# Patient Record
Sex: FEMALE | Race: OTHER | HISPANIC OR LATINO | URBAN - METROPOLITAN AREA
[De-identification: names, ages, dates, MRNs, and addresses within clinical notes are randomized per-mention and may not be internally consistent; named-entity substitution may affect disease eponyms.]

---

## 2017-01-16 ENCOUNTER — EMERGENCY (EMERGENCY)
Facility: HOSPITAL | Age: 57
LOS: 1 days | Discharge: HOME | End: 2017-01-16
Attending: EMERGENCY MEDICINE | Admitting: EMERGENCY MEDICINE
Payer: MEDICAID

## 2017-01-16 VITALS
HEART RATE: 82 BPM | DIASTOLIC BLOOD PRESSURE: 75 MMHG | OXYGEN SATURATION: 98 % | WEIGHT: 106.04 LBS | RESPIRATION RATE: 17 BRPM | HEIGHT: 63 IN | SYSTOLIC BLOOD PRESSURE: 124 MMHG | TEMPERATURE: 98 F

## 2017-01-16 DIAGNOSIS — M25.561 PAIN IN RIGHT KNEE: ICD-10-CM

## 2017-01-16 DIAGNOSIS — J45.909 UNSPECIFIED ASTHMA, UNCOMPLICATED: ICD-10-CM

## 2017-01-16 DIAGNOSIS — Z88.5 ALLERGY STATUS TO NARCOTIC AGENT: ICD-10-CM

## 2017-01-16 PROCEDURE — 73562 X-RAY EXAM OF KNEE 3: CPT | Mod: 26,RT

## 2017-01-16 PROCEDURE — 99283 EMERGENCY DEPT VISIT LOW MDM: CPT

## 2017-01-16 RX ORDER — IBUPROFEN 200 MG
600 TABLET ORAL ONCE
Qty: 0 | Refills: 0 | Status: COMPLETED | OUTPATIENT
Start: 2017-01-16 | End: 2017-01-16

## 2017-01-16 RX ADMIN — Medication 600 MILLIGRAM(S): at 23:48

## 2017-01-16 RX ADMIN — Medication 600 MILLIGRAM(S): at 22:40

## 2017-01-16 NOTE — ED ADULT NURSE NOTE - CHIEF COMPLAINT QUOTE
patient brought in by ambulance from [facility] complaining of pain on the L knee. + history of fall several months back and hurt knee. Pt + ETOH smell.

## 2017-01-16 NOTE — ED PROVIDER NOTE - MEDICAL DECISION MAKING DETAILS
R knee pain x months after fall, will check xrays, nsaids, and likely d/c w/ [place] [facility] [facility] f/u

## 2017-01-16 NOTE — ED PROVIDER NOTE - ATTENDING CONTRIBUTION TO CARE
56 yof pw R knee pain x 2 mo.  states sustained after falling on stairs initially.  able to ambulate but needs help with climbing stairs.  No calf pain or tenderness.    agree w/ resident, no obvious joint effusion, able to raise hip and extend knee (w/ pain), no erythema, no deformity.  will xray, possibly soft tissue injury.

## 2017-01-16 NOTE — ED ADULT TRIAGE NOTE - CHIEF COMPLAINT QUOTE
patient brought in by ambulance from [facility] complaining of pain on the L knee. + history of fall several months back and hurt knee. Pt + ETOH smell.

## 2017-01-16 NOTE — ED PROVIDER NOTE - OBJECTIVE STATEMENT
57yo F p/w 2mo of R knee pain s/p fall on stairs.  Was seen at [place] ED w/ neg xrays per pt.  Still w/ pain and needs help ambulating w/ stairs.  Taking tylenol w/o relief.  Denies further trauma.  Hasn't seen an MD since initial ED visit.

## 2017-11-24 ENCOUNTER — EMERGENCY (EMERGENCY)
Facility: HOSPITAL | Age: 57
LOS: 1 days | Discharge: HOME | End: 2017-11-24
Attending: EMERGENCY MEDICINE | Admitting: EMERGENCY MEDICINE
Payer: MEDICAID

## 2017-11-24 VITALS
HEART RATE: 70 BPM | TEMPERATURE: 98 F | SYSTOLIC BLOOD PRESSURE: 112 MMHG | RESPIRATION RATE: 18 BRPM | DIASTOLIC BLOOD PRESSURE: 86 MMHG | OXYGEN SATURATION: 99 %

## 2017-11-24 DIAGNOSIS — R41.82 ALTERED MENTAL STATUS, UNSPECIFIED: ICD-10-CM

## 2017-11-24 DIAGNOSIS — F10.129 ALCOHOL ABUSE WITH INTOXICATION, UNSPECIFIED: ICD-10-CM

## 2017-11-24 DIAGNOSIS — Z88.5 ALLERGY STATUS TO NARCOTIC AGENT: ICD-10-CM

## 2017-11-24 LAB
ALBUMIN SERPL ELPH-MCNC: 3.3 G/DL — LOW (ref 3.4–5)
ALP SERPL-CCNC: 91 U/L — SIGNIFICANT CHANGE UP (ref 40–120)
ALT FLD-CCNC: 29 U/L — SIGNIFICANT CHANGE UP (ref 12–42)
ANION GAP SERPL CALC-SCNC: 9 MMOL/L — SIGNIFICANT CHANGE UP (ref 9–16)
AST SERPL-CCNC: 46 U/L — HIGH (ref 15–37)
BASOPHILS NFR BLD AUTO: 2 % — SIGNIFICANT CHANGE UP (ref 0–2)
BILIRUB SERPL-MCNC: 0.1 MG/DL — LOW (ref 0.2–1.2)
BUN SERPL-MCNC: 6 MG/DL — LOW (ref 7–23)
CALCIUM SERPL-MCNC: 8.6 MG/DL — SIGNIFICANT CHANGE UP (ref 8.5–10.5)
CHLORIDE SERPL-SCNC: 108 MMOL/L — SIGNIFICANT CHANGE UP (ref 96–108)
CO2 SERPL-SCNC: 26 MMOL/L — SIGNIFICANT CHANGE UP (ref 22–31)
CREAT SERPL-MCNC: 0.47 MG/DL — LOW (ref 0.5–1.3)
EOSINOPHIL NFR BLD AUTO: 1.4 % — SIGNIFICANT CHANGE UP (ref 0–6)
ETHANOL SERPL-MCNC: 333 MG/DL — HIGH
GLUCOSE SERPL-MCNC: 103 MG/DL — HIGH (ref 70–99)
HCT VFR BLD CALC: 27.8 % — LOW (ref 34.5–45)
HGB BLD-MCNC: 8.7 G/DL — LOW (ref 11.5–15.5)
IMM GRANULOCYTES NFR BLD AUTO: 0.3 % — SIGNIFICANT CHANGE UP (ref 0–1.5)
LYMPHOCYTES # BLD AUTO: 55.9 % — HIGH (ref 13–44)
MAGNESIUM SERPL-MCNC: 1.5 MG/DL — LOW (ref 1.6–2.6)
MCHC RBC-ENTMCNC: 24 PG — LOW (ref 27–34)
MCHC RBC-ENTMCNC: 31.3 G/DL — LOW (ref 32–36)
MCV RBC AUTO: 76.8 FL — LOW (ref 80–100)
MONOCYTES NFR BLD AUTO: 13 % — SIGNIFICANT CHANGE UP (ref 2–14)
NEUTROPHILS NFR BLD AUTO: 27.4 % — LOW (ref 43–77)
PLATELET # BLD AUTO: 223 K/UL — SIGNIFICANT CHANGE UP (ref 150–400)
POTASSIUM SERPL-MCNC: 3.8 MMOL/L — SIGNIFICANT CHANGE UP (ref 3.5–5.3)
POTASSIUM SERPL-SCNC: 3.8 MMOL/L — SIGNIFICANT CHANGE UP (ref 3.5–5.3)
PROT SERPL-MCNC: 7.8 G/DL — SIGNIFICANT CHANGE UP (ref 6.4–8.2)
RBC # BLD: 3.62 M/UL — LOW (ref 3.8–5.2)
RBC # FLD: 17.6 % — HIGH (ref 10.3–16.9)
SODIUM SERPL-SCNC: 143 MMOL/L — SIGNIFICANT CHANGE UP (ref 132–145)
WBC # BLD: 3.4 K/UL — LOW (ref 3.8–10.5)
WBC # FLD AUTO: 3.4 K/UL — LOW (ref 3.8–10.5)

## 2017-11-24 PROCEDURE — 99285 EMERGENCY DEPT VISIT HI MDM: CPT

## 2017-11-24 PROCEDURE — 70450 CT HEAD/BRAIN W/O DYE: CPT | Mod: 26

## 2017-11-24 NOTE — ED PROVIDER NOTE - MEDICAL DECISION MAKING DETAILS
Pt BIBEMS for AMS, will do labs, check BAL, HCT since pt seems somewhat ataxic. Observe for sobriety

## 2017-11-24 NOTE — ED ADULT NURSE NOTE - OBJECTIVE STATEMENT
patient states that she feels weak, denies any drug usage and states that she has not had any alcohol since yesterday.

## 2017-11-24 NOTE — ED PROVIDER NOTE - OBJECTIVE STATEMENT
56 female pt, hx of alcohol abuse, BIBEMS for public intoxication[place]. States she usually drinks a bit more than usual on special ocassions (like Thanksgiving - yesterday). No falls or trauma, no abd pain, no chest pain or any other concerns.

## 2017-11-24 NOTE — ED PROVIDER NOTE - PROGRESS NOTE DETAILS
HCT unremarkable for acute changes, BAL increased, other labs probably chronic derangements. Pt lives In a shelter in the [place]

## 2017-11-24 NOTE — ED PROVIDER NOTE - NEUROLOGICAL, MLM
somnolent but answers questions and follow simple commands, normal strength in all extrems, unsteady gait

## 2018-01-13 ENCOUNTER — INPATIENT (INPATIENT)
Facility: HOSPITAL | Age: 58
LOS: 2 days | Discharge: HOME | DRG: 871 | End: 2018-01-16
Attending: HOSPITALIST | Admitting: HOSPITALIST
Payer: MEDICAID

## 2018-01-13 VITALS
HEART RATE: 110 BPM | OXYGEN SATURATION: 95 % | SYSTOLIC BLOOD PRESSURE: 137 MMHG | DIASTOLIC BLOOD PRESSURE: 86 MMHG | TEMPERATURE: 98 F | RESPIRATION RATE: 18 BRPM

## 2018-01-13 DIAGNOSIS — J45.909 UNSPECIFIED ASTHMA, UNCOMPLICATED: ICD-10-CM

## 2018-01-13 DIAGNOSIS — J18.9 PNEUMONIA, UNSPECIFIED ORGANISM: ICD-10-CM

## 2018-01-13 DIAGNOSIS — E61.2 MAGNESIUM DEFICIENCY: ICD-10-CM

## 2018-01-13 DIAGNOSIS — A41.9 SEPSIS, UNSPECIFIED ORGANISM: ICD-10-CM

## 2018-01-13 DIAGNOSIS — F10.10 ALCOHOL ABUSE, UNCOMPLICATED: ICD-10-CM

## 2018-01-13 DIAGNOSIS — Z29.9 ENCOUNTER FOR PROPHYLACTIC MEASURES, UNSPECIFIED: ICD-10-CM

## 2018-01-13 DIAGNOSIS — R77.8 OTHER SPECIFIED ABNORMALITIES OF PLASMA PROTEINS: ICD-10-CM

## 2018-01-13 DIAGNOSIS — D50.9 IRON DEFICIENCY ANEMIA, UNSPECIFIED: ICD-10-CM

## 2018-01-13 LAB
ALBUMIN SERPL ELPH-MCNC: 3.1 G/DL — LOW (ref 3.4–5)
ALP SERPL-CCNC: 108 U/L — SIGNIFICANT CHANGE UP (ref 40–120)
ALT FLD-CCNC: 25 U/L — SIGNIFICANT CHANGE UP (ref 12–42)
ANION GAP SERPL CALC-SCNC: 12 MMOL/L — SIGNIFICANT CHANGE UP (ref 9–16)
APPEARANCE UR: CLEAR — SIGNIFICANT CHANGE UP
AST SERPL-CCNC: 45 U/L — HIGH (ref 15–37)
BASOPHILS NFR BLD AUTO: 0.2 % — SIGNIFICANT CHANGE UP (ref 0–2)
BILIRUB SERPL-MCNC: 1.1 MG/DL — SIGNIFICANT CHANGE UP (ref 0.2–1.2)
BILIRUB UR-MCNC: (no result)
BUN SERPL-MCNC: 4 MG/DL — LOW (ref 7–23)
CALCIUM SERPL-MCNC: 9 MG/DL — SIGNIFICANT CHANGE UP (ref 8.5–10.5)
CHLORIDE SERPL-SCNC: 96 MMOL/L — SIGNIFICANT CHANGE UP (ref 96–108)
CO2 SERPL-SCNC: 25 MMOL/L — SIGNIFICANT CHANGE UP (ref 22–31)
COLOR SPEC: YELLOW — SIGNIFICANT CHANGE UP
CREAT SERPL-MCNC: 0.49 MG/DL — LOW (ref 0.5–1.3)
DIFF PNL FLD: NEGATIVE — SIGNIFICANT CHANGE UP
EOSINOPHIL NFR BLD AUTO: 0 % — SIGNIFICANT CHANGE UP (ref 0–6)
ETHANOL SERPL-MCNC: <10 MG/DL — SIGNIFICANT CHANGE UP (ref 0–10)
FERRITIN SERPL-MCNC: 280.8 NG/ML — HIGH (ref 15–150)
FLUAV SPEC QL CULT: NEGATIVE — SIGNIFICANT CHANGE UP
FLUBV AG SPEC QL IA: NEGATIVE — SIGNIFICANT CHANGE UP
GLUCOSE SERPL-MCNC: 89 MG/DL — SIGNIFICANT CHANGE UP (ref 70–99)
GLUCOSE UR QL: NEGATIVE — SIGNIFICANT CHANGE UP
HCT VFR BLD CALC: 29.6 % — LOW (ref 34.5–45)
HGB BLD-MCNC: 9.4 G/DL — LOW (ref 11.5–15.5)
IMM GRANULOCYTES NFR BLD AUTO: 0.5 % — SIGNIFICANT CHANGE UP (ref 0–1.5)
IRON SATN MFR SERPL: 18 % — SIGNIFICANT CHANGE UP (ref 14–50)
IRON SATN MFR SERPL: 42 UG/DL — SIGNIFICANT CHANGE UP (ref 30–160)
KETONES UR-MCNC: 40 MG/DL
LACTATE SERPL-SCNC: 1.9 MMOL/L — SIGNIFICANT CHANGE UP (ref 0.4–2)
LEUKOCYTE ESTERASE UR-ACNC: (no result)
LYMPHOCYTES # BLD AUTO: 6.7 % — LOW (ref 13–44)
MAGNESIUM SERPL-MCNC: 1 MG/DL — CRITICAL LOW (ref 1.6–2.6)
MCHC RBC-ENTMCNC: 23.8 PG — LOW (ref 27–34)
MCHC RBC-ENTMCNC: 31.8 G/DL — LOW (ref 32–36)
MCV RBC AUTO: 74.9 FL — LOW (ref 80–100)
MONOCYTES NFR BLD AUTO: 6.4 % — SIGNIFICANT CHANGE UP (ref 2–14)
NEUTROPHILS NFR BLD AUTO: 86.2 % — HIGH (ref 43–77)
NITRITE UR-MCNC: NEGATIVE — SIGNIFICANT CHANGE UP
PH UR: 6 — SIGNIFICANT CHANGE UP (ref 5–8)
PLATELET # BLD AUTO: 262 K/UL — SIGNIFICANT CHANGE UP (ref 150–400)
POTASSIUM SERPL-MCNC: 4.2 MMOL/L — SIGNIFICANT CHANGE UP (ref 3.5–5.3)
POTASSIUM SERPL-SCNC: 4.2 MMOL/L — SIGNIFICANT CHANGE UP (ref 3.5–5.3)
PROT SERPL-MCNC: 9 G/DL — HIGH (ref 6.4–8.2)
PROT UR-MCNC: (no result) MG/DL
RBC # BLD: 3.95 M/UL — SIGNIFICANT CHANGE UP (ref 3.8–5.2)
RBC # FLD: 18.1 % — HIGH (ref 10.3–16.9)
SODIUM SERPL-SCNC: 133 MMOL/L — SIGNIFICANT CHANGE UP (ref 132–145)
SP GR SPEC: 1.01 — SIGNIFICANT CHANGE UP (ref 1–1.03)
TIBC SERPL-MCNC: 236 UG/DL — SIGNIFICANT CHANGE UP (ref 220–430)
TRANSFERRIN SERPL-MCNC: 194 MG/DL — LOW (ref 200–360)
UROBILINOGEN FLD QL: 1 E.U./DL — SIGNIFICANT CHANGE UP
WBC # BLD: 20.1 K/UL — HIGH (ref 3.8–10.5)
WBC # FLD AUTO: 20.1 K/UL — HIGH (ref 3.8–10.5)

## 2018-01-13 PROCEDURE — 99285 EMERGENCY DEPT VISIT HI MDM: CPT | Mod: 25

## 2018-01-13 PROCEDURE — 71046 X-RAY EXAM CHEST 2 VIEWS: CPT | Mod: 26

## 2018-01-13 PROCEDURE — 99223 1ST HOSP IP/OBS HIGH 75: CPT | Mod: GC

## 2018-01-13 RX ORDER — ACETAMINOPHEN 500 MG
650 TABLET ORAL EVERY 6 HOURS
Qty: 0 | Refills: 0 | Status: DISCONTINUED | OUTPATIENT
Start: 2018-01-13 | End: 2018-01-16

## 2018-01-13 RX ORDER — FOLIC ACID 0.8 MG
1 TABLET ORAL DAILY
Qty: 0 | Refills: 0 | Status: DISCONTINUED | OUTPATIENT
Start: 2018-01-13 | End: 2018-01-16

## 2018-01-13 RX ORDER — MAGNESIUM SULFATE 500 MG/ML
4 VIAL (ML) INJECTION ONCE
Qty: 0 | Refills: 0 | Status: DISCONTINUED | OUTPATIENT
Start: 2018-01-13 | End: 2018-01-13

## 2018-01-13 RX ORDER — ALBUTEROL 90 UG/1
2.5 AEROSOL, METERED ORAL ONCE
Qty: 0 | Refills: 0 | Status: COMPLETED | OUTPATIENT
Start: 2018-01-13 | End: 2018-01-13

## 2018-01-13 RX ORDER — MAGNESIUM SULFATE 500 MG/ML
2 VIAL (ML) INJECTION ONCE
Qty: 0 | Refills: 0 | Status: COMPLETED | OUTPATIENT
Start: 2018-01-13 | End: 2018-01-13

## 2018-01-13 RX ORDER — AZITHROMYCIN 500 MG/1
500 TABLET, FILM COATED ORAL EVERY 24 HOURS
Qty: 0 | Refills: 0 | Status: DISCONTINUED | OUTPATIENT
Start: 2018-01-14 | End: 2018-01-15

## 2018-01-13 RX ORDER — CEFTRIAXONE 500 MG/1
1 INJECTION, POWDER, FOR SOLUTION INTRAMUSCULAR; INTRAVENOUS ONCE
Qty: 0 | Refills: 0 | Status: COMPLETED | OUTPATIENT
Start: 2018-01-13 | End: 2018-01-13

## 2018-01-13 RX ORDER — CEFTRIAXONE 500 MG/1
1 INJECTION, POWDER, FOR SOLUTION INTRAMUSCULAR; INTRAVENOUS EVERY 24 HOURS
Qty: 0 | Refills: 0 | Status: DISCONTINUED | OUTPATIENT
Start: 2018-01-14 | End: 2018-01-16

## 2018-01-13 RX ORDER — INFLUENZA VIRUS VACCINE 15; 15; 15; 15 UG/.5ML; UG/.5ML; UG/.5ML; UG/.5ML
0.5 SUSPENSION INTRAMUSCULAR ONCE
Qty: 0 | Refills: 0 | Status: COMPLETED | OUTPATIENT
Start: 2018-01-13 | End: 2018-01-16

## 2018-01-13 RX ORDER — HEPARIN SODIUM 5000 [USP'U]/ML
5000 INJECTION INTRAVENOUS; SUBCUTANEOUS EVERY 12 HOURS
Qty: 0 | Refills: 0 | Status: DISCONTINUED | OUTPATIENT
Start: 2018-01-13 | End: 2018-01-16

## 2018-01-13 RX ORDER — IPRATROPIUM/ALBUTEROL SULFATE 18-103MCG
3 AEROSOL WITH ADAPTER (GRAM) INHALATION EVERY 6 HOURS
Qty: 0 | Refills: 0 | Status: DISCONTINUED | OUTPATIENT
Start: 2018-01-13 | End: 2018-01-16

## 2018-01-13 RX ORDER — THIAMINE MONONITRATE (VIT B1) 100 MG
100 TABLET ORAL DAILY
Qty: 0 | Refills: 0 | Status: DISCONTINUED | OUTPATIENT
Start: 2018-01-13 | End: 2018-01-16

## 2018-01-13 RX ORDER — AZITHROMYCIN 500 MG/1
500 TABLET, FILM COATED ORAL ONCE
Qty: 0 | Refills: 0 | Status: COMPLETED | OUTPATIENT
Start: 2018-01-13 | End: 2018-01-13

## 2018-01-13 RX ORDER — MAGNESIUM SULFATE 500 MG/ML
4 VIAL (ML) INJECTION ONCE
Qty: 0 | Refills: 0 | Status: COMPLETED | OUTPATIENT
Start: 2018-01-13 | End: 2018-01-13

## 2018-01-13 RX ORDER — ACETAMINOPHEN 500 MG
650 TABLET ORAL ONCE
Qty: 0 | Refills: 0 | Status: COMPLETED | OUTPATIENT
Start: 2018-01-13 | End: 2018-01-13

## 2018-01-13 RX ORDER — SODIUM CHLORIDE 9 MG/ML
2000 INJECTION INTRAMUSCULAR; INTRAVENOUS; SUBCUTANEOUS ONCE
Qty: 0 | Refills: 0 | Status: COMPLETED | OUTPATIENT
Start: 2018-01-13 | End: 2018-01-13

## 2018-01-13 RX ORDER — IPRATROPIUM BROMIDE 0.2 MG/ML
500 SOLUTION, NON-ORAL INHALATION ONCE
Qty: 0 | Refills: 0 | Status: COMPLETED | OUTPATIENT
Start: 2018-01-13 | End: 2018-01-13

## 2018-01-13 RX ORDER — KETOROLAC TROMETHAMINE 30 MG/ML
30 SYRINGE (ML) INJECTION ONCE
Qty: 0 | Refills: 0 | Status: DISCONTINUED | OUTPATIENT
Start: 2018-01-13 | End: 2018-01-13

## 2018-01-13 RX ADMIN — ALBUTEROL 2.5 MILLIGRAM(S): 90 AEROSOL, METERED ORAL at 12:47

## 2018-01-13 RX ADMIN — Medication 650 MILLIGRAM(S): at 14:38

## 2018-01-13 RX ADMIN — Medication 1 MILLIGRAM(S): at 21:52

## 2018-01-13 RX ADMIN — Medication 650 MILLIGRAM(S): at 21:51

## 2018-01-13 RX ADMIN — CEFTRIAXONE 100 GRAM(S): 500 INJECTION, POWDER, FOR SOLUTION INTRAMUSCULAR; INTRAVENOUS at 15:53

## 2018-01-13 RX ADMIN — Medication 25 MILLIGRAM(S): at 21:52

## 2018-01-13 RX ADMIN — AZITHROMYCIN 255 MILLIGRAM(S): 500 TABLET, FILM COATED ORAL at 13:45

## 2018-01-13 RX ADMIN — SODIUM CHLORIDE 2000 MILLILITER(S): 9 INJECTION INTRAMUSCULAR; INTRAVENOUS; SUBCUTANEOUS at 14:38

## 2018-01-13 RX ADMIN — Medication 100 GRAM(S): at 21:00

## 2018-01-13 RX ADMIN — Medication 50 GRAM(S): at 17:42

## 2018-01-13 RX ADMIN — Medication 500 MICROGRAM(S): at 12:47

## 2018-01-13 RX ADMIN — Medication 30 MILLIGRAM(S): at 16:36

## 2018-01-13 RX ADMIN — Medication 650 MILLIGRAM(S): at 22:21

## 2018-01-13 RX ADMIN — Medication 30 MILLIGRAM(S): at 15:55

## 2018-01-13 NOTE — H&P ADULT - PROBLEM SELECTOR PLAN 5
magnesium of 1; replete as necessary Patient with hgb of of 9.4 with MCV of 72; denies any acute bleeding; DDX includes iron deficiency anemia vs. thalassemia vs. lead poisoning (homeless); RDW elevated most likely iron deficiency anemia   -- check iron studies- may need colonoscopy as outpatient Patent with gamma gap noted on exam; DDx includes infectious (HIV and Hep C) vs. malignancy (multiple myeloma) vs.  autoimmune diagnosis   -- check HIV and hep c for now   -- check spep and upep for now

## 2018-01-13 NOTE — H&P ADULT - PROBLEM SELECTOR PLAN 6
Fluids: NS @ 80 c an hour  electrolytes: replete PRN  Nutrtion: pureed diet ( missing her dentures)    Prophylactic measure: none  FULL CODE  DISPO: admit for IV abx Fluids: NS @ 80 c an hour  electrolytes: replete PRN  Nutrition: pureed diet ( missing her dentures)    Prophylactic measure: none  FULL CODE  DISPO: admit for IV abx magnesium of 1; replete as necessary Patient with hgb of of 9.4 with MCV of 72; denies any acute bleeding; DDX includes iron deficiency anemia vs. thalassemia vs. lead poisoning (homeless); RDW elevated most likely iron deficiency anemia   -- check iron studies- may need colonoscopy as outpatient

## 2018-01-13 NOTE — H&P ADULT - PROBLEM SELECTOR PROBLEM 7
Prophylactic measure Asthma, unspecified asthma severity, unspecified whether complicated, unspecified whether persistent Magnesium deficiency

## 2018-01-13 NOTE — H&P ADULT - PROBLEM SELECTOR PLAN 7
Fluids: NS @ 80 c an hour  electrolytes: replete PRN  Nutrition: pureed diet ( missing her dentures)    Prophylactic measure: none  FULL CODE  DISPO: admit for IV abx on duonebs prn, no wheezing on exam magnesium of 1; replete as necessary

## 2018-01-13 NOTE — H&P ADULT - ATTENDING COMMENTS
patient seen and examined  reviewed vs, labs, relevant radiological reports/ studies ekg  agree w/ PE findings as above w/ additions/ exceptions/ pertinent findings: pt more lethargic 2/2 taking benzo, pt is thin, frail; tongue appears slightly dry; +LLL crackles    1. sepsis/ LLL PNA: on ceftriaxone and azithromycin ; follow up RVP (rapid influenza was negative)   2.   3.   4. patient seen and examined  reviewed vs, labs, relevant radiological reports/ studies ekg  agree w/ PE findings as above w/ additions/ exceptions/ pertinent findings: pt more lethargic 2/2 taking benzo, pt is thin, frail; tongue appears slightly dry; +LLL crackles    1. sepsis/ LLL PNA: on ceftriaxone and azithromycin ; follow up RVP (rapid influenza was negative) , ctxs; maintenance fluids overnight  2. ETOH abuse: monitor CIWA, c/w librium standing and ativan prn   3. elevated protein: plan as above   4. microcytic anemia: iron studies, needs further workup as outpatient  5. hypomagnesemia: monitor mg, lytes

## 2018-01-13 NOTE — ED PROVIDER NOTE - OBJECTIVE STATEMENT
57y F with h/o asthma p/w cough and SOB. Pt reports she was seen at [place] yesterday, had a CXR and was diagnosed with PNA. Given Rx for Abx but was unable to fill prescription. Pt c/o persistent coughing, subjective fever, and mild wheezing. Denies leg pain, swelling, CP, or N/V. 57y F with h/o asthma p/w cough and SOB. Pt reports she was seen at [place] yesterday, had a CXR and was diagnosed with PNA. Given Rx for Abx but was unable to fill prescription. Pt c/o persistent coughing, subjective fever, and mild wheezing. Denies leg pain or swelling, CP, or N/V.

## 2018-01-13 NOTE — ED ADULT NURSE REASSESSMENT NOTE - NS ED NURSE REASSESS COMMENT FT1
Pt mag 1.0 replacement started prior to transport., Pt stable no issues.
Pt febrile given tordal and antibiotics, pt has liter  of fluid.

## 2018-01-13 NOTE — H&P ADULT - PROBLEM SELECTOR PLAN 1
Patient reports cough with productive sputum, lethargy, MARS and URI type symptoms for 4 days PTA; PE pertinent for LLL crackles; CXR with LLL consolidation; patient adequately resuscitated at [facility]; MEETS SIRS CRITERIA WITH WBC, TACHYCARDIA AND RR; RVP negative for flu   --ceftriaxone and azithromycin IV   -- check urine legionella  -- f/u blood cultures  -- tylenol PRN Patient reports cough with productive sputum, lethargy, MARS and URI type symptoms for 4 days PTA; PE pertinent for LLL crackles; CXR with LLL consolidation; patient adequately resuscitated at [facility]; MEETS SIRS CRITERIA WITH WBC, TACHYCARDIA AND RR; RVP negative for flu; Curb 65 is 0  --ceftriaxone and azithromycin IV   -- check urine legionella  -- f/u blood cultures  -- tylenol PRN  -- check RVP

## 2018-01-13 NOTE — H&P ADULT - PROBLEM SELECTOR PLAN 8
Fluids: NS @ 80 c an hour  electrolytes: replete PRN  Nutrition: pureed diet ( missing her dentures)    Prophylactic measure: none  FULL CODE  DISPO: admit for IV abx on duonebs prn, no wheezing on exam

## 2018-01-13 NOTE — ED ADULT TRIAGE NOTE - CHIEF COMPLAINT QUOTE
Patient complaining of cough, congestion, weakness. Recently diagnosed with pneumonia non complainant with medication

## 2018-01-13 NOTE — H&P ADULT - PROBLEM SELECTOR PLAN 4
Patient with hgb of of 9.4 with MCV of 72; denies any acute bleeding; DDX includes iron deficiency anemia vs. thalassemia vs. lead poisoning (homeless)   -- check iron studies- may need colonscopy as outpatient Patient with hgb of of 9.4 with MCV of 72; denies any acute bleeding; DDX includes iron deficiency anemia vs. thalassemia vs. lead poisoning (homeless); RDW elevated most likely iron deficiency anemia   -- check iron studies- may need colonoscopy as outpatient Patent with gamma gap noted on exam; DDx includes infectious (HIV and Hep C) vs. malignancy (multiple myeloma) vs.  autoimmune diagnosis   -- check HIV and hep c for now   -- check spep and upep for now see #3

## 2018-01-13 NOTE — ED PROVIDER NOTE - MEDICAL DECISION MAKING DETAILS
57y F with cough and subjective fever. Slightly tachycardiac. Will send labs, give nebs, and repeat CXR to r/o PNA. Not currently concerned for ACS, PE, or dissection.

## 2018-01-13 NOTE — ED PROVIDER NOTE - DIAGNOSTIC INTERPRETATION
x-ray chest, 2 views - INTERPRETATION: normal bones and soft tissues, normal heart border and diaphragms, increased markings to bilateral lower lobes, left greater than right, consistent with left lobar PNA. x-ray chest, 2 views - INTERPRETATION: normal bones and soft tissues; normal heart border and diaphragms; increased markings to bilateral lower lobes, left greater than right, consistent with left lobar PNA.

## 2018-01-13 NOTE — ED PROVIDER NOTE - PROGRESS NOTE DETAILS
Pt with lobar PNA, elevated WBC count. Currently sating well. Patient will need admission for IV ABx, continued bronchodilator therapy as needed, and further work-up and treatment as indicated. Pt remains slightly tachycardic but with stable BP. Was afebrile but improved after Tylenol and Toradol. Sating well on room air. Contacted Dr. Packer who accepts pt to regional medicine for further work-up and treatment of PNA.

## 2018-01-13 NOTE — H&P ADULT - PROBLEM SELECTOR PLAN 3
Patient reports drinking 6-8 cans of beer a day; last drink was 9 pm friday   -- trend CIWA checks for now  -- ativan PRN Patient reports drinking 6-8 cans of beer a day; last drink was 9 pm friday; CIWA currently at 9 (headache, anxiety, agitation)  -- will start librium 25 mg TID and ativan 1 ,mg PRN ( avoid sedation)   -- trend CIWA checks for now  -- ativan PRN  -- Patient reports drinking 6-8 cans of beer a day; last drink was 9 pm friday; CIWA currently at 9 (headache, anxiety, agitation)  -- will start librium 25 mg TID and ativan 1 ,mg PRN ( avoid sedation)   -- trend CIWA checks for now  -- ativan PRN Patient reports drinking 6-8 cans of beer a day; last drink was 9 pm friday; CIWA currently at 9 (headache, anxiety, agitation)  -- will start librium 25 mg TID and ativan 1 ,mg PRN ( avoid sedation)   -- trend CIWA checks for now  -- start thiamine and folate   -- ativan PRN Patient reports drinking 6-8 cans of beer a day; last drink was 9 pm friday; CIWA currently at 9 (headache, anxiety, agitation)  -- will start librium 25 mg TID and ativan 1 mg PRN ( avoid sedation)   -- trend CIWA checks for now  -- start thiamine and folate   -- ativan PRN

## 2018-01-13 NOTE — H&P ADULT - NSHPPHYSICALEXAM_GEN_ALL_CORE
.  VITAL SIGNS:  T(C): 36.5 (01-13-18 @ 19:08), Max: 37.9 (01-13-18 @ 15:37)  T(F): 97.7 (01-13-18 @ 19:08), Max: 100.2 (01-13-18 @ 15:37)  HR: 90 (01-13-18 @ 19:08) (90 - 125)  BP: 135/70 (01-13-18 @ 19:08) (103/65 - 137/86)  BP(mean): 92 (01-13-18 @ 19:08) (92 - 92)  RR: 18 (01-13-18 @ 19:08) (18 - 18)  SpO2: 100% (01-13-18 @ 19:08) (95% - 100%)  Wt(kg): --    PHYSICAL EXAM:    Constitutional: WDWN resting comfortably in bed; reporting that she is cold; frail-looking  Head: NC/AT  Eyes: PERRL, EOMI, clear conjunctiva  ENT: no nasal discharge; uvula midline, no oropharyngeal erythema or exudates; MMM  Neck: supple; no JVD   Respiratory: crackles appreciated at the LLL; good airway entry otherwise with no accessory muscle use   Cardiac: +S1/S2; RRR; no M/R/G;   Gastrointestinal: soft, NT/ND; no rebound or guarding;   Back: spine midline, no bony tenderness or step-offs;   Extremities: WWP, no clubbing or cyanosis; no peripheral edema  Musculoskeletal: NROM x4;   Vascular: 2+ radial and DP pulses B/L  Dermatologic: skin very warm, dry and intact; no rashes seen   Lymphatic: no submandibular or cervical LAD  Neurologic: AAOx3; CNII-XII grossly intact; no focal deficits  Psychiatric: affect and characteristics of appearance, verbalizations, behaviors are appropriate

## 2018-01-13 NOTE — H&P ADULT - HISTORY OF PRESENT ILLNESS
Patient is a 56 yo female with a pmhx of asthma and extensive alcohol hx who is here presenting from [facility] with cough, dyspnea on exertion and lethargy. Patient reports she is undomicilied and on Wednesday suddenly developed a cough with whitish sputum. Patient symptoms included rhinorrhea, lethargy and dyspnea with exertion. NO SOB at rest and no hemoptysis reported. Patient also reporting fevers and chills developing friday that propmpted her to go to [facility] Friday morning who diagnosed her with a pneumonia but only gave her "3 tylenols" as per partner at bedside. Patient then was seen at [facility].     Of note, patient reports an extensive drinking hx. She partakes 6-8 cans of beer every couple of days or so. Patient reports going through withdrawal in the past but no seizures reported. Last drink was 9 pm on friday evening.     At [facility] ED, her vitals were T(F): Max: 100.2  HR: 90 - 125 BP: 103/65 - 137/86 RR: 18 SpO2: 95% - 100% on RA. labs were significant for a white count, microcytic anemia and a normal lactate.  Patient was given 2 L of NS and ceftriaxone and azithromycin and albuterol Patient is a 56 yo female with a pmhx of asthma and extensive alcohol hx who is here presenting from [place] with cough, dyspnea on exertion and lethargy. Patient reports she is undomicilied and on Wednesday suddenly developed a cough with whitish sputum. Patient symptoms included rhinorrhea, lethargy and dyspnea with exertion. NO SOB at rest and no hemoptysis reported. Patient also reporting fevers and chills developing friday that propmpted her to go to [facility] Friday morning who diagnosed her with a pneumonia but only gave her "3 tylenols" as per partner at bedside. Patient then was seen at [place].   ROS Is negative for weight loss, abdominal pain, N/V/D or changes in urinary symptoms.     Of note, patient reports an extensive drinking hx. She partakes 6-8 cans of beer every couple of days or so. Patient reports going through withdrawal in the past but no seizures reported. Last drink was 9 pm on friday evening.     At [place] ED, her vitals were T(F): Max: 100.2  HR: 90 - 125 BP: 103/65 - 137/86 RR: 18 SpO2: 95% - 100% on RA. labs were significant for a white count, microcytic anemia and a normal lactate.  Patient was given 2 L of NS and ceftriaxone and azithromycin and albuterol

## 2018-01-13 NOTE — H&P ADULT - PROBLEM SELECTOR PLAN 9
Fluids: NS @ 80 c an hour  electrolytes: replete PRN  Nutrition: pureed diet ( missing her dentures)    Prophylactic measure: none  FULL CODE  DISPO: admit for IV abx

## 2018-01-13 NOTE — H&P ADULT - NSHPSOCIALHISTORY_GEN_ALL_CORE
Patient currently homeless and lives with partner in shelter; Reports drinking 6-8 cans of beer every other day; Denies any smoking hx or drug use; denies marijuana use

## 2018-01-13 NOTE — H&P ADULT - PROBLEM SELECTOR PROBLEM 8
Prophylactic measure Asthma, unspecified asthma severity, unspecified whether complicated, unspecified whether persistent

## 2018-01-13 NOTE — H&P ADULT - ASSESSMENT
Patient is a 56 yo female with a pmhx of asthma and extensive alcohol hx who is here presenting from [facility] with cough, dyspnea on exertion and lethargy.

## 2018-01-13 NOTE — H&P ADULT - NSHPLABSRESULTS_GEN_ALL_CORE
.  LABS:                         9.4    20.1  )-----------( 262      ( 2018 13:20 )             29.6         133  |  96  |  4<L>  ----------------------------<  89  4.2   |  25  |  0.49<L>    Ca    9.0      2018 13:20  Mg     1.0         TPro  9.0<H>  /  Alb  3.1<L>  /  TBili  1.1  /  DBili  x   /  AST  45<H>  /  ALT  25  /  AlkPhos  108        Urinalysis Basic - ( 2018 17:05 )    Color: Yellow / Appearance: Clear / S.015 / pH: x  Gluc: x / Ketone: 40 mg/dL  / Bili: Small / Urobili: 1.0 E.U./dL   Blood: x / Protein: Trace mg/dL / Nitrite: NEGATIVE   Leuk Esterase: Trace / RBC: < 5 /HPF / WBC 5-10 /HPF   Sq Epi: x / Non Sq Epi: Few /HPF / Bacteria: Present /HPF            Lactate, Blood: 1.9 mmoL/L ( @ 14:38)      RADIOLOGY, EKG & ADDITIONAL TESTS: CXR remarkable for LLL consolidation

## 2018-01-14 DIAGNOSIS — F10.239 ALCOHOL DEPENDENCE WITH WITHDRAWAL, UNSPECIFIED: ICD-10-CM

## 2018-01-14 LAB
ANION GAP SERPL CALC-SCNC: 15 MMOL/L — SIGNIFICANT CHANGE UP (ref 5–17)
ANION GAP SERPL CALC-SCNC: 15 MMOL/L — SIGNIFICANT CHANGE UP (ref 5–17)
APTT BLD: 29.1 SEC — SIGNIFICANT CHANGE UP (ref 27.5–37.4)
BASOPHILS NFR BLD AUTO: 0.2 % — SIGNIFICANT CHANGE UP (ref 0–2)
BUN SERPL-MCNC: 2 MG/DL — LOW (ref 7–23)
BUN SERPL-MCNC: 4 MG/DL — LOW (ref 7–23)
CALCIUM SERPL-MCNC: 7.7 MG/DL — LOW (ref 8.4–10.5)
CALCIUM SERPL-MCNC: 7.8 MG/DL — LOW (ref 8.4–10.5)
CHLORIDE SERPL-SCNC: 100 MMOL/L — SIGNIFICANT CHANGE UP (ref 96–108)
CHLORIDE SERPL-SCNC: 95 MMOL/L — LOW (ref 96–108)
CO2 SERPL-SCNC: 20 MMOL/L — LOW (ref 22–31)
CO2 SERPL-SCNC: 22 MMOL/L — SIGNIFICANT CHANGE UP (ref 22–31)
CREAT SERPL-MCNC: 0.35 MG/DL — LOW (ref 0.5–1.3)
CREAT SERPL-MCNC: 0.37 MG/DL — LOW (ref 0.5–1.3)
EOSINOPHIL NFR BLD AUTO: 0.2 % — SIGNIFICANT CHANGE UP (ref 0–6)
GLUCOSE SERPL-MCNC: 106 MG/DL — HIGH (ref 70–99)
GLUCOSE SERPL-MCNC: 177 MG/DL — HIGH (ref 70–99)
HCT VFR BLD CALC: 27.6 % — LOW (ref 34.5–45)
HCV AB S/CO SERPL IA: 0.14 S/CO — SIGNIFICANT CHANGE UP
HCV AB SERPL-IMP: SIGNIFICANT CHANGE UP
HGB BLD-MCNC: 8.8 G/DL — LOW (ref 11.5–15.5)
HIV 1+2 AB+HIV1 P24 AG SERPL QL IA: SIGNIFICANT CHANGE UP
INR BLD: 1.27 — HIGH (ref 0.88–1.16)
LEGIONELLA AG UR QL: NEGATIVE — SIGNIFICANT CHANGE UP
LYMPHOCYTES # BLD AUTO: 6.5 % — LOW (ref 13–44)
MAGNESIUM SERPL-MCNC: 2.1 MG/DL — SIGNIFICANT CHANGE UP (ref 1.6–2.6)
MAGNESIUM SERPL-MCNC: 3.6 MG/DL — HIGH (ref 1.6–2.6)
MCHC RBC-ENTMCNC: 23.5 PG — LOW (ref 27–34)
MCHC RBC-ENTMCNC: 31.9 G/DL — LOW (ref 32–36)
MCV RBC AUTO: 73.6 FL — LOW (ref 80–100)
MONOCYTES NFR BLD AUTO: 8.2 % — SIGNIFICANT CHANGE UP (ref 2–14)
NEUTROPHILS NFR BLD AUTO: 84.9 % — HIGH (ref 43–77)
PHOSPHATE SERPL-MCNC: 1.8 MG/DL — LOW (ref 2.5–4.5)
PLATELET # BLD AUTO: 261 K/UL — SIGNIFICANT CHANGE UP (ref 150–400)
POTASSIUM SERPL-MCNC: 3.1 MMOL/L — LOW (ref 3.5–5.3)
POTASSIUM SERPL-MCNC: 3.6 MMOL/L — SIGNIFICANT CHANGE UP (ref 3.5–5.3)
POTASSIUM SERPL-SCNC: 3.1 MMOL/L — LOW (ref 3.5–5.3)
POTASSIUM SERPL-SCNC: 3.6 MMOL/L — SIGNIFICANT CHANGE UP (ref 3.5–5.3)
PROTHROM AB SERPL-ACNC: 14.2 SEC — HIGH (ref 9.8–12.7)
RAPID RVP RESULT: SIGNIFICANT CHANGE UP
RBC # BLD: 3.75 M/UL — LOW (ref 3.8–5.2)
RBC # FLD: 18.6 % — HIGH (ref 10.3–16.9)
SODIUM SERPL-SCNC: 132 MMOL/L — LOW (ref 135–145)
SODIUM SERPL-SCNC: 135 MMOL/L — SIGNIFICANT CHANGE UP (ref 135–145)
UIBC SERPL-MCNC: 194 UG/DL — SIGNIFICANT CHANGE UP (ref 110–370)
WBC # BLD: 12.5 K/UL — HIGH (ref 3.8–10.5)
WBC # FLD AUTO: 12.5 K/UL — HIGH (ref 3.8–10.5)

## 2018-01-14 PROCEDURE — 99233 SBSQ HOSP IP/OBS HIGH 50: CPT

## 2018-01-14 RX ORDER — POTASSIUM CHLORIDE 20 MEQ
40 PACKET (EA) ORAL ONCE
Qty: 0 | Refills: 0 | Status: COMPLETED | OUTPATIENT
Start: 2018-01-14 | End: 2018-01-14

## 2018-01-14 RX ORDER — POTASSIUM CHLORIDE 20 MEQ
40 PACKET (EA) ORAL EVERY 4 HOURS
Qty: 0 | Refills: 0 | Status: COMPLETED | OUTPATIENT
Start: 2018-01-14 | End: 2018-01-14

## 2018-01-14 RX ORDER — SODIUM CHLORIDE 9 MG/ML
1000 INJECTION INTRAMUSCULAR; INTRAVENOUS; SUBCUTANEOUS
Qty: 0 | Refills: 0 | Status: DISCONTINUED | OUTPATIENT
Start: 2018-01-14 | End: 2018-01-15

## 2018-01-14 RX ADMIN — Medication 1 MILLIGRAM(S): at 11:56

## 2018-01-14 RX ADMIN — AZITHROMYCIN 255 MILLIGRAM(S): 500 TABLET, FILM COATED ORAL at 14:07

## 2018-01-14 RX ADMIN — Medication 40 MILLIEQUIVALENT(S): at 03:26

## 2018-01-14 RX ADMIN — Medication 40 MILLIEQUIVALENT(S): at 11:55

## 2018-01-14 RX ADMIN — CEFTRIAXONE 100 GRAM(S): 500 INJECTION, POWDER, FOR SOLUTION INTRAMUSCULAR; INTRAVENOUS at 15:44

## 2018-01-14 RX ADMIN — Medication 25 MILLIGRAM(S): at 05:02

## 2018-01-14 RX ADMIN — SODIUM CHLORIDE 100 MILLILITER(S): 9 INJECTION INTRAMUSCULAR; INTRAVENOUS; SUBCUTANEOUS at 03:27

## 2018-01-14 RX ADMIN — HEPARIN SODIUM 5000 UNIT(S): 5000 INJECTION INTRAVENOUS; SUBCUTANEOUS at 17:07

## 2018-01-14 RX ADMIN — SODIUM CHLORIDE 100 MILLILITER(S): 9 INJECTION INTRAMUSCULAR; INTRAVENOUS; SUBCUTANEOUS at 23:36

## 2018-01-14 RX ADMIN — HEPARIN SODIUM 5000 UNIT(S): 5000 INJECTION INTRAVENOUS; SUBCUTANEOUS at 05:02

## 2018-01-14 RX ADMIN — Medication 100 MILLIGRAM(S): at 11:56

## 2018-01-14 RX ADMIN — SODIUM CHLORIDE 100 MILLILITER(S): 9 INJECTION INTRAMUSCULAR; INTRAVENOUS; SUBCUTANEOUS at 13:44

## 2018-01-14 RX ADMIN — Medication 25 MILLIGRAM(S): at 13:44

## 2018-01-14 RX ADMIN — Medication 40 MILLIEQUIVALENT(S): at 00:38

## 2018-01-14 NOTE — PROGRESS NOTE ADULT - PROBLEM SELECTOR PLAN 3
Patient reports drinking 6-8 cans of beer a day; last drink was 9 pm friday  - c/w Librium 25 mg TID and Ativan 1 mg q2h PRN  - CIWA checks  - thiamine 100mg PO daily, folic acid 1mg PO daily

## 2018-01-14 NOTE — DIETITIAN INITIAL EVALUATION ADULT. - ENERGY NEEDS
Height: 59" Weight: 81.7lbs, IBW 98lbs+/-10%, %IBW 82%, BMI - 16.5  ABW used to calculate energy needs due to pt's current body weight within % IBW  Nutrient needs based on [facility] standards of care for repletion in adults.

## 2018-01-14 NOTE — PROGRESS NOTE ADULT - SUBJECTIVE AND OBJECTIVE BOX
CC: cough, MARS, lethargy x 4 days  OVERNIGHT EVENTS/SUBJECTIVE/ROS: Pt has pleuritic chest pain with coughing, otherwise no complaints.    VITAL SIGNS:  Vital Signs Last 24 Hrs  T(C): 36.9 (2018 09:15), Max: 37.9 (2018 15:37)  T(F): 98.4 (2018 09:15), Max: 100.2 (2018 15:37)  HR: 105 (2018 09:15) (90 - 125)  BP: 108/70 (2018 09:15) (103/65 - 137/86)  BP(mean): 92 (2018 19:08) (92 - 92)  RR: 18 (2018 09:15) (18 - 20)  SpO2: 98% (2018 09:15) (95% - 100%)    PHYSICAL EXAM:    General: lying in bed, in NAD, intermittent coughing  HEENT: EOMI, anicteric  Neck: supple  Cardiovascular: S1, S2, RRR   Respiratory: LLL crackles, no wheezing  Gastrointestinal: soft NTND abdomen, +BS  Extremities: no peripheral edema, WWP  Vascular: 2+ pulses radial; Dp/PT  Neurological: alert awake oriented    MEDICATIONS:  MEDICATIONS  (STANDING):  azithromycin  IVPB 500 milliGRAM(s) IV Intermittent every 24 hours  cefTRIAXone   IVPB 1 Gram(s) IV Intermittent every 24 hours  chlordiazePOXIDE 25 milliGRAM(s) Oral every 8 hours  folic acid 1 milliGRAM(s) Oral daily  heparin  Injectable 5000 Unit(s) SubCutaneous every 12 hours  influenza   Vaccine 0.5 milliLiter(s) IntraMuscular once  potassium chloride    Tablet ER 40 milliEquivalent(s) Oral once  sodium chloride 0.9%. 1000 milliLiter(s) (100 mL/Hr) IV Continuous <Continuous>  thiamine 100 milliGRAM(s) Oral daily    MEDICATIONS  (PRN):  acetaminophen   Tablet 650 milliGRAM(s) Oral every 6 hours PRN For Temp greater than 38 C (100.4 F)  acetaminophen   Tablet. 650 milliGRAM(s) Oral every 6 hours PRN Moderate Pain (4 - 6)  ALBUTerol/ipratropium for Nebulization 3 milliLiter(s) Nebulizer every 6 hours PRN Bronchospasm      ALLERGIES:  Allergies    codeine (Unknown)    Intolerances        LABS:                        8.8    12.5  )-----------( 261      ( 2018 08:24 )             27.6     -    135  |  100  |  2<L>  ----------------------------<  106<H>  3.6   |  20<L>  |  0.37<L>    Ca    7.8<L>      2018 08:24  Phos  1.8       Mg     2.1         TPro  9.0<H>  /  Alb  3.1<L>  /  TBili  1.1  /  DBili  x   /  AST  45<H>  /  ALT  25  /  AlkPhos  108      PT/INR - ( 2018 08:24 )   PT: 14.2 sec;   INR: 1.27          PTT - ( 2018 08:24 )  PTT:29.1 sec  Urinalysis Basic - ( 2018 17:05 )    Color: Yellow / Appearance: Clear / S.015 / pH: x  Gluc: x / Ketone: 40 mg/dL  / Bili: Small / Urobili: 1.0 E.U./dL   Blood: x / Protein: Trace mg/dL / Nitrite: NEGATIVE   Leuk Esterase: Trace / RBC: < 5 /HPF / WBC 5-10 /HPF   Sq Epi: x / Non Sq Epi: Few /HPF / Bacteria: Present /HPF      CAPILLARY BLOOD GLUCOSE          RADIOLOGY & ADDITIONAL TESTS: Reviewed.

## 2018-01-14 NOTE — CHART NOTE - NSCHARTNOTEFT_GEN_A_CORE
Upon Nutritional Assessment by the Registered Dietitian your patient was determined to meet criteria / has evidence of the following diagnosis/diagnoses:          [ ]  Mild Protein Calorie Malnutrition        [ ]  Moderate Protein Calorie Malnutrition        [X] Severe Protein Calorie Malnutrition        [ ] Unspecified Protein Calorie Malnutrition        [ ] Underweight / BMI <19        [ ] Morbid Obesity / BMI > 40      Findings as based on:  •  Comprehensive nutrition assessment and consultation  •  Calorie counts (nutrient intake analysis)  •  Food acceptance and intake status from observations by staff  •  Follow up  •  Patient education  •  Intervention secondary to interdisciplinary rounds  •   concerns    Pt with 15 lb wt loss in 1-2 months     Treatment:    The following diet has been recommended:  1.) Rec. Ensure Enlive TID  2.) Consider mechanical soft diet (pt disliked pureed diet)       PROVIDER Section:     By signing this assessment you are acknowledging and agree with the diagnosis/diagnoses assigned by the Registered Dietitian    Comments:

## 2018-01-14 NOTE — DIETITIAN INITIAL EVALUATION ADULT. - PROBLEM SELECTOR PLAN 1
Patient reports cough with productive sputum, lethargy, MARS and URI type symptoms for 4 days PTA; PE pertinent for LLL crackles; CXR with LLL consolidation; patient adequately resuscitated at [facility]; MEETS SIRS CRITERIA WITH WBC, TACHYCARDIA AND RR; RVP negative for flu; Curb 65 is 0  --ceftriaxone and azithromycin IV   -- check urine legionella  -- f/u blood cultures  -- tylenol PRN  -- check RVP

## 2018-01-14 NOTE — DIETITIAN INITIAL EVALUATION ADULT. - NS AS NUTRI INTERV MEALS SNACK
Consider upgrading diet to [facility] soft, pt reports to tolerate softer foods without dentures

## 2018-01-14 NOTE — DIETITIAN INITIAL EVALUATION ADULT. - PROBLEM SELECTOR PLAN 5
Patient with hgb of of 9.4 with MCV of 72; denies any acute bleeding; DDX includes iron deficiency anemia vs. thalassemia vs. lead poisoning (homeless); RDW elevated most likely iron deficiency anemia   -- check iron studies- may need colonoscopy as outpatient

## 2018-01-14 NOTE — DIETITIAN INITIAL EVALUATION ADULT. - PROBLEM SELECTOR PLAN 3
Patient reports drinking 6-8 cans of beer a day; last drink was 9 pm friday; CIWA currently at 9 (headache, anxiety, agitation)  -- will start librium 25 mg TID and ativan 1 mg PRN ( avoid sedation)   -- trend CIWA checks for now  -- start thiamine and folate   -- ativan PRN

## 2018-01-14 NOTE — DIETITIAN INITIAL EVALUATION ADULT. - OTHER INFO
Patient is a 56 yo female with a pmhx of asthma and extensive alcohol hx who is here presenting from [facility] with cough, dyspnea on exertion and lethargy, admitted for sepsis 2/2 CAP. Pt with poor PO intake at present 2/2 not feeling well. No n/v/d/c. Denies any pain. Reports ~15 lb wt loss over the past couple months due to limited access to food and decreased appetite. Pt agreed to try Ensure supplements. Pt with missing teeth, and no dentures, yet reports she can tolerate softer foods. Does not like the pureed diet. NKFA.

## 2018-01-14 NOTE — PROGRESS NOTE ADULT - PROBLEM SELECTOR PLAN 5
Patient with hgb of of 9.4 with MCV of 72; denies any acute bleeding  - iron studies indicative of anemia of chronic disease with elevated ferritin, low transferrin  - continue to trend

## 2018-01-14 NOTE — PROGRESS NOTE ADULT - PROBLEM SELECTOR PLAN 1
Patient reports cough with productive sputum, lethargy, MARS and URI type symptoms for 4 days PTA; PE pertinent for LLL crackles; CXR with LLL consolidation; patient adequately resuscitated at [facility]; MEETS SIRS CRITERIA WITH WBC, TACHYCARDIA AND RR; RVP negative for flu; Curb 65 is 0  - c/w ceftriaxone and azithromycin (1/13- )  - f/u urine legionella  - f/u blood cultures (1/13)  - Tylenol PRN for fever  - f/u RVP

## 2018-01-14 NOTE — PROGRESS NOTE ADULT - PROBLEM SELECTOR PLAN 4
Patent with gamma gap noted on exam; DDx includes infectious (HIV and Hep C) vs. malignancy (multiple myeloma) vs.  autoimmune diagnosis   - HIV neg, Hep C neg  - f/u SPEP, UPEP

## 2018-01-14 NOTE — DIETITIAN INITIAL EVALUATION ADULT. - PROBLEM SELECTOR PLAN 4
Patent with gamma gap noted on exam; DDx includes infectious (HIV and Hep C) vs. malignancy (multiple myeloma) vs.  autoimmune diagnosis   -- check HIV and hep c for now   -- check spep and upep for now

## 2018-01-14 NOTE — PROGRESS NOTE ADULT - ATTENDING COMMENTS
Patient was seen and examined by me at bedside. I agree with resident's note, subjective, objective physical exam, assessment and plan with following modifications/additions.     1) Sepsis (POA) 2/2 LLL pna, likely CAP  2) CAP  3) EtOH withdrawal  4) EtOH abuse  5) Microcytic anemia  6) Hypomagnesemia  7) Hypokalemia  8) Hyponatremia  9) Acute diarrhea    Plan: Clinically improved. Still coughing up. Will send for sputum cx. Legionella ag in urine (pna + diarrhea + low Na). C/w ceftriaxone and azithromycin for now. F/u blood and sputum cx. C/w librium 25mg po q8h. If no ativan requirement, can taper to librium 25 q12h tomorrow.   Est DC on 1/15, pending improvement.

## 2018-01-15 DIAGNOSIS — E83.42 HYPOMAGNESEMIA: ICD-10-CM

## 2018-01-15 DIAGNOSIS — E87.6 HYPOKALEMIA: ICD-10-CM

## 2018-01-15 DIAGNOSIS — E87.1 HYPO-OSMOLALITY AND HYPONATREMIA: ICD-10-CM

## 2018-01-15 DIAGNOSIS — R19.7 DIARRHEA, UNSPECIFIED: ICD-10-CM

## 2018-01-15 LAB
ANION GAP SERPL CALC-SCNC: 12 MMOL/L — SIGNIFICANT CHANGE UP (ref 5–17)
BASOPHILS NFR BLD AUTO: 0.3 % — SIGNIFICANT CHANGE UP (ref 0–2)
BUN SERPL-MCNC: 2 MG/DL — LOW (ref 7–23)
CALCIUM SERPL-MCNC: 8.8 MG/DL — SIGNIFICANT CHANGE UP (ref 8.4–10.5)
CHLORIDE SERPL-SCNC: 101 MMOL/L — SIGNIFICANT CHANGE UP (ref 96–108)
CO2 SERPL-SCNC: 20 MMOL/L — LOW (ref 22–31)
CREAT SERPL-MCNC: 0.43 MG/DL — LOW (ref 0.5–1.3)
EOSINOPHIL NFR BLD AUTO: 0.9 % — SIGNIFICANT CHANGE UP (ref 0–6)
GLUCOSE SERPL-MCNC: 112 MG/DL — HIGH (ref 70–99)
HCT VFR BLD CALC: 28.3 % — LOW (ref 34.5–45)
HGB BLD-MCNC: 8.9 G/DL — LOW (ref 11.5–15.5)
LYMPHOCYTES # BLD AUTO: 14.9 % — SIGNIFICANT CHANGE UP (ref 13–44)
MAGNESIUM SERPL-MCNC: 1.2 MG/DL — LOW (ref 1.6–2.6)
MCHC RBC-ENTMCNC: 23.7 PG — LOW (ref 27–34)
MCHC RBC-ENTMCNC: 31.4 G/DL — LOW (ref 32–36)
MCV RBC AUTO: 75.3 FL — LOW (ref 80–100)
MONOCYTES NFR BLD AUTO: 13.5 % — SIGNIFICANT CHANGE UP (ref 2–14)
NEUTROPHILS NFR BLD AUTO: 70.4 % — SIGNIFICANT CHANGE UP (ref 43–77)
PHOSPHATE SERPL-MCNC: 1.8 MG/DL — LOW (ref 2.5–4.5)
PLATELET # BLD AUTO: 260 K/UL — SIGNIFICANT CHANGE UP (ref 150–400)
POTASSIUM SERPL-MCNC: 3.7 MMOL/L — SIGNIFICANT CHANGE UP (ref 3.5–5.3)
POTASSIUM SERPL-SCNC: 3.7 MMOL/L — SIGNIFICANT CHANGE UP (ref 3.5–5.3)
PROT ?TM UR-MCNC: <4 MG/DL — SIGNIFICANT CHANGE UP (ref 0–12)
PROT SERPL-MCNC: 6.7 G/DL — SIGNIFICANT CHANGE UP (ref 6–8.3)
PROT SERPL-MCNC: 6.7 G/DL — SIGNIFICANT CHANGE UP (ref 6–8.3)
RBC # BLD: 3.76 M/UL — LOW (ref 3.8–5.2)
RBC # FLD: 18.6 % — HIGH (ref 10.3–16.9)
SODIUM SERPL-SCNC: 133 MMOL/L — LOW (ref 135–145)
WBC # BLD: 9.3 K/UL — SIGNIFICANT CHANGE UP (ref 3.8–10.5)
WBC # FLD AUTO: 9.3 K/UL — SIGNIFICANT CHANGE UP (ref 3.8–10.5)

## 2018-01-15 PROCEDURE — 99233 SBSQ HOSP IP/OBS HIGH 50: CPT

## 2018-01-15 RX ORDER — POTASSIUM PHOSPHATE, MONOBASIC POTASSIUM PHOSPHATE, DIBASIC 236; 224 MG/ML; MG/ML
30 INJECTION, SOLUTION INTRAVENOUS ONCE
Qty: 0 | Refills: 0 | Status: COMPLETED | OUTPATIENT
Start: 2018-01-15 | End: 2018-01-15

## 2018-01-15 RX ORDER — AZITHROMYCIN 500 MG/1
250 TABLET, FILM COATED ORAL DAILY
Qty: 0 | Refills: 0 | Status: DISCONTINUED | OUTPATIENT
Start: 2018-01-15 | End: 2018-01-16

## 2018-01-15 RX ORDER — MAGNESIUM SULFATE 500 MG/ML
4 VIAL (ML) INJECTION ONCE
Qty: 0 | Refills: 0 | Status: COMPLETED | OUTPATIENT
Start: 2018-01-15 | End: 2018-01-15

## 2018-01-15 RX ADMIN — Medication 25 MILLIGRAM(S): at 21:40

## 2018-01-15 RX ADMIN — AZITHROMYCIN 250 MILLIGRAM(S): 500 TABLET, FILM COATED ORAL at 10:42

## 2018-01-15 RX ADMIN — Medication 25 MILLIGRAM(S): at 00:48

## 2018-01-15 RX ADMIN — Medication 1 MILLIGRAM(S): at 10:40

## 2018-01-15 RX ADMIN — CEFTRIAXONE 100 GRAM(S): 500 INJECTION, POWDER, FOR SOLUTION INTRAMUSCULAR; INTRAVENOUS at 15:53

## 2018-01-15 RX ADMIN — Medication 100 MILLIGRAM(S): at 10:42

## 2018-01-15 RX ADMIN — Medication 200 MILLIGRAM(S): at 10:39

## 2018-01-15 RX ADMIN — HEPARIN SODIUM 5000 UNIT(S): 5000 INJECTION INTRAVENOUS; SUBCUTANEOUS at 05:19

## 2018-01-15 RX ADMIN — HEPARIN SODIUM 5000 UNIT(S): 5000 INJECTION INTRAVENOUS; SUBCUTANEOUS at 17:54

## 2018-01-15 RX ADMIN — Medication 100 GRAM(S): at 15:53

## 2018-01-15 RX ADMIN — POTASSIUM PHOSPHATE, MONOBASIC POTASSIUM PHOSPHATE, DIBASIC 65 MILLIMOLE(S): 236; 224 INJECTION, SOLUTION INTRAVENOUS at 10:40

## 2018-01-15 NOTE — PROGRESS NOTE ADULT - SUBJECTIVE AND OBJECTIVE BOX
cc: "Still SOB", "Coughing up a lot", "pain when I breathe"  No other complaints.  Rest of ROS negative.    Vital Signs Last 24 Hrs  T(C): 37 (15 Kole 2018 09:59), Max: 37.2 (14 Jan 2018 15:11)  T(F): 98.6 (15 Kole 2018 09:59), Max: 99 (14 Jan 2018 15:11)  HR: 97 (15 Kole 2018 09:59) (85 - 100)  BP: 135/79 (15 Kole 2018 09:59) (124/76 - 135/79)  BP(mean): 97 (14 Jan 2018 15:11) (97 - 97)  RR: 16 (15 Kole 2018 09:59) (16 - 21)  SpO2: 98% (15 Kole 2018 09:59) (98% - 99%)    PHYSICAL EXAMINATION  * General: Not in acute distress. Awake and alert. Lying comfortably in bed.  * Head: Normocephalic, atraumatic.  * HEENT: ears no discharge, eyes PERRLA, nose no discharge, throat no exudates, normal tonsils.  * Neck: no JVD, supple.  * Lungs: Bilateral crackles, mainly LLL.   * Cardio: Regular rate and rhythm, no murmurs, no rubs, no gallops. Good peripheral pulses.  * Abdomen: Soft, non-tender, non-distended, tympanic to percussion, no rebound, no guarding, no rigidity. Bowel sounds present. No suprapubic or CVA tenderness.  * : Deferred.  * Extremities: Acyanotic, no edema.  * Skin: Warm and dry.  * Neuro: Alert and oriented x 3. No focal deficits. Motor strength is 5/5 throughout. Sensation intact. Cranial nerves II-XII grossly intact.                           8.9    9.3   )-----------( 260      ( 15 Kole 2018 07:33 )             28.3     01-15    133<L>  |  101  |  2<L>  ----------------------------<  112<H>  3.7   |  20<L>  |  0.43<L>    Ca    8.8      15 Kole 2018 07:33  Phos  1.8     01-15  Mg     1.2     01-15    MEDICATIONS  (STANDING):  azithromycin   Tablet 250 milliGRAM(s) Oral daily  cefTRIAXone   IVPB 1 Gram(s) IV Intermittent every 24 hours  chlordiazePOXIDE 25 milliGRAM(s) Oral every 12 hours  folic acid 1 milliGRAM(s) Oral daily  heparin  Injectable 5000 Unit(s) SubCutaneous every 12 hours  influenza   Vaccine 0.5 milliLiter(s) IntraMuscular once  magnesium sulfate  IVPB 4 Gram(s) IV Intermittent once  thiamine 100 milliGRAM(s) Oral daily    MEDICATIONS  (PRN):  acetaminophen   Tablet 650 milliGRAM(s) Oral every 6 hours PRN For Temp greater than 38 C (100.4 F)  acetaminophen   Tablet. 650 milliGRAM(s) Oral every 6 hours PRN Moderate Pain (4 - 6)  ALBUTerol/ipratropium for Nebulization 3 milliLiter(s) Nebulizer every 6 hours PRN Bronchospasm  LORazepam   Injectable 1 milliGRAM(s) IV Push every 2 hours PRN CIWA > 7

## 2018-01-15 NOTE — PROGRESS NOTE ADULT - PROBLEM SELECTOR PLAN 2
CAP of LLL.   * Still sob and increased work of breathing.   * Will c/w ceftriaxone and azithromycin.   * DC tomorrow 1/16 on Levaquin.

## 2018-01-16 ENCOUNTER — TRANSCRIPTION ENCOUNTER (OUTPATIENT)
Age: 58
End: 2018-01-16

## 2018-01-16 VITALS
SYSTOLIC BLOOD PRESSURE: 104 MMHG | HEART RATE: 111 BPM | OXYGEN SATURATION: 96 % | DIASTOLIC BLOOD PRESSURE: 68 MMHG | TEMPERATURE: 99 F | RESPIRATION RATE: 18 BRPM

## 2018-01-16 PROBLEM — Z00.00 ENCOUNTER FOR PREVENTIVE HEALTH EXAMINATION: Status: ACTIVE | Noted: 2018-01-16

## 2018-01-16 LAB
% ALBUMIN: 41.4 % — SIGNIFICANT CHANGE UP
% ALPHA 1: 7.5 % — SIGNIFICANT CHANGE UP
% ALPHA 2: 11.8 % — SIGNIFICANT CHANGE UP
% BETA: 13.2 % — SIGNIFICANT CHANGE UP
% GAMMA, URINE: 45 % — SIGNIFICANT CHANGE UP
% GAMMA: 26.1 % — SIGNIFICANT CHANGE UP
ALBUMIN 24H MFR UR ELPH: 20.6 % — SIGNIFICANT CHANGE UP
ALBUMIN SERPL ELPH-MCNC: 2.8 G/DL — LOW (ref 3.6–5.5)
ALBUMIN/GLOB SERPL ELPH: 0.7 RATIO — SIGNIFICANT CHANGE UP
ALPHA1 GLOB 24H MFR UR ELPH: 8.3 % — SIGNIFICANT CHANGE UP
ALPHA1 GLOB SERPL ELPH-MCNC: 0.5 G/DL — HIGH (ref 0.1–0.4)
ALPHA2 GLOB 24H MFR UR ELPH: 13.2 % — SIGNIFICANT CHANGE UP
ALPHA2 GLOB SERPL ELPH-MCNC: 0.8 G/DL — SIGNIFICANT CHANGE UP (ref 0.5–1)
ANION GAP SERPL CALC-SCNC: 17 MMOL/L — SIGNIFICANT CHANGE UP (ref 5–17)
B-GLOBULIN 24H MFR UR ELPH: 12.9 % — SIGNIFICANT CHANGE UP
B-GLOBULIN SERPL ELPH-MCNC: 0.9 G/DL — SIGNIFICANT CHANGE UP (ref 0.5–1)
BUN SERPL-MCNC: 4 MG/DL — LOW (ref 7–23)
CALCIUM SERPL-MCNC: 9.8 MG/DL — SIGNIFICANT CHANGE UP (ref 8.4–10.5)
CHLORIDE SERPL-SCNC: 95 MMOL/L — LOW (ref 96–108)
CO2 SERPL-SCNC: 21 MMOL/L — LOW (ref 22–31)
CREAT SERPL-MCNC: 0.51 MG/DL — SIGNIFICANT CHANGE UP (ref 0.5–1.3)
CULTURE RESULTS: SIGNIFICANT CHANGE UP
GAMMA GLOBULIN: 1.7 G/DL — HIGH (ref 0.6–1.6)
GLUCOSE SERPL-MCNC: 104 MG/DL — HIGH (ref 70–99)
GRAM STN FLD: SIGNIFICANT CHANGE UP
HCT VFR BLD CALC: 29.3 % — LOW (ref 34.5–45)
HGB BLD-MCNC: 9.6 G/DL — LOW (ref 11.5–15.5)
INTERPRETATION 24H UR IFE-IMP: SIGNIFICANT CHANGE UP
INTERPRETATION SERPL IFE-IMP: SIGNIFICANT CHANGE UP
M PROTEIN 24H UR ELPH-MRATE: SIGNIFICANT CHANGE UP
MAGNESIUM SERPL-MCNC: 1.8 MG/DL — SIGNIFICANT CHANGE UP (ref 1.6–2.6)
MCHC RBC-ENTMCNC: 23.5 PG — LOW (ref 27–34)
MCHC RBC-ENTMCNC: 32.8 G/DL — SIGNIFICANT CHANGE UP (ref 32–36)
MCV RBC AUTO: 71.6 FL — LOW (ref 80–100)
PLATELET # BLD AUTO: 326 K/UL — SIGNIFICANT CHANGE UP (ref 150–400)
POTASSIUM SERPL-MCNC: 4 MMOL/L — SIGNIFICANT CHANGE UP (ref 3.5–5.3)
POTASSIUM SERPL-SCNC: 4 MMOL/L — SIGNIFICANT CHANGE UP (ref 3.5–5.3)
PROT ?TM UR-MCNC: <4 MG/DL — SIGNIFICANT CHANGE UP (ref 0–12)
PROT PATTERN 24H UR ELPH-IMP: SIGNIFICANT CHANGE UP
PROT PATTERN SERPL ELPH-IMP: SIGNIFICANT CHANGE UP
RBC # BLD: 4.09 M/UL — SIGNIFICANT CHANGE UP (ref 3.8–5.2)
RBC # FLD: 18.6 % — HIGH (ref 10.3–16.9)
SODIUM SERPL-SCNC: 133 MMOL/L — LOW (ref 135–145)
SPECIMEN SOURCE: SIGNIFICANT CHANGE UP
WBC # BLD: 7.3 K/UL — SIGNIFICANT CHANGE UP (ref 3.8–10.5)
WBC # FLD AUTO: 7.3 K/UL — SIGNIFICANT CHANGE UP (ref 3.8–10.5)

## 2018-01-16 PROCEDURE — 90686 IIV4 VACC NO PRSV 0.5 ML IM: CPT

## 2018-01-16 PROCEDURE — 71046 X-RAY EXAM CHEST 2 VIEWS: CPT

## 2018-01-16 PROCEDURE — 83550 IRON BINDING TEST: CPT

## 2018-01-16 PROCEDURE — 87449 NOS EACH ORGANISM AG IA: CPT

## 2018-01-16 PROCEDURE — 87581 M.PNEUMON DNA AMP PROBE: CPT

## 2018-01-16 PROCEDURE — 85730 THROMBOPLASTIN TIME PARTIAL: CPT

## 2018-01-16 PROCEDURE — 87633 RESP VIRUS 12-25 TARGETS: CPT

## 2018-01-16 PROCEDURE — 87070 CULTURE OTHR SPECIMN AEROBIC: CPT

## 2018-01-16 PROCEDURE — 80307 DRUG TEST PRSMV CHEM ANLYZR: CPT

## 2018-01-16 PROCEDURE — 96374 THER/PROPH/DIAG INJ IV PUSH: CPT

## 2018-01-16 PROCEDURE — 86803 HEPATITIS C AB TEST: CPT

## 2018-01-16 PROCEDURE — 84155 ASSAY OF PROTEIN SERUM: CPT

## 2018-01-16 PROCEDURE — 94640 AIRWAY INHALATION TREATMENT: CPT

## 2018-01-16 PROCEDURE — 36415 COLL VENOUS BLD VENIPUNCTURE: CPT

## 2018-01-16 PROCEDURE — 87389 HIV-1 AG W/HIV-1&-2 AB AG IA: CPT

## 2018-01-16 PROCEDURE — 80048 BASIC METABOLIC PNL TOTAL CA: CPT

## 2018-01-16 PROCEDURE — 99285 EMERGENCY DEPT VISIT HI MDM: CPT | Mod: 25

## 2018-01-16 PROCEDURE — 84466 ASSAY OF TRANSFERRIN: CPT

## 2018-01-16 PROCEDURE — 80053 COMPREHEN METABOLIC PANEL: CPT

## 2018-01-16 PROCEDURE — 99239 HOSP IP/OBS DSCHRG MGMT >30: CPT

## 2018-01-16 PROCEDURE — 87400 INFLUENZA A/B EACH AG IA: CPT

## 2018-01-16 PROCEDURE — 86334 IMMUNOFIX E-PHORESIS SERUM: CPT

## 2018-01-16 PROCEDURE — 87486 CHLMYD PNEUM DNA AMP PROBE: CPT

## 2018-01-16 PROCEDURE — 87798 DETECT AGENT NOS DNA AMP: CPT

## 2018-01-16 PROCEDURE — 85610 PROTHROMBIN TIME: CPT

## 2018-01-16 PROCEDURE — 81001 URINALYSIS AUTO W/SCOPE: CPT

## 2018-01-16 PROCEDURE — 87040 BLOOD CULTURE FOR BACTERIA: CPT

## 2018-01-16 PROCEDURE — 83605 ASSAY OF LACTIC ACID: CPT

## 2018-01-16 PROCEDURE — 84165 PROTEIN E-PHORESIS SERUM: CPT

## 2018-01-16 PROCEDURE — 82728 ASSAY OF FERRITIN: CPT

## 2018-01-16 PROCEDURE — 85027 COMPLETE CBC AUTOMATED: CPT

## 2018-01-16 PROCEDURE — 84100 ASSAY OF PHOSPHORUS: CPT

## 2018-01-16 PROCEDURE — 96375 TX/PRO/DX INJ NEW DRUG ADDON: CPT

## 2018-01-16 PROCEDURE — 93005 ELECTROCARDIOGRAM TRACING: CPT

## 2018-01-16 PROCEDURE — 85025 COMPLETE CBC W/AUTO DIFF WBC: CPT

## 2018-01-16 PROCEDURE — 83735 ASSAY OF MAGNESIUM: CPT

## 2018-01-16 RX ORDER — MAGNESIUM SULFATE 500 MG/ML
2 VIAL (ML) INJECTION ONCE
Qty: 0 | Refills: 0 | Status: COMPLETED | OUTPATIENT
Start: 2018-01-16 | End: 2018-01-16

## 2018-01-16 RX ADMIN — HEPARIN SODIUM 5000 UNIT(S): 5000 INJECTION INTRAVENOUS; SUBCUTANEOUS at 05:41

## 2018-01-16 RX ADMIN — AZITHROMYCIN 250 MILLIGRAM(S): 500 TABLET, FILM COATED ORAL at 12:52

## 2018-01-16 RX ADMIN — Medication 50 GRAM(S): at 10:57

## 2018-01-16 RX ADMIN — Medication 100 MILLIGRAM(S): at 12:53

## 2018-01-16 RX ADMIN — Medication 1 MILLIGRAM(S): at 12:52

## 2018-01-16 RX ADMIN — INFLUENZA VIRUS VACCINE 0.5 MILLILITER(S): 15; 15; 15; 15 SUSPENSION INTRAMUSCULAR at 14:33

## 2018-01-16 RX ADMIN — Medication 25 MILLIGRAM(S): at 12:52

## 2018-01-16 NOTE — DISCHARGE NOTE ADULT - MEDICATION SUMMARY - MEDICATIONS TO TAKE
I will START or STAY ON the medications listed below when I get home from the hospital:    Proventil HFA 90 mcg/inh inhalation aerosol  -- 2 puff(s) inhaled 4 times a day, As Needed  -- Indication: For Asthma, unspecified asthma severity, unspecified whether complicated, unspecified whether persistent    Levaquin 750 mg oral tablet  -- 1 tab(s) by mouth once a day   -- Avoid prolonged or excessive exposure to direct and/or artificial sunlight while taking this medication.  Do not take dairy products, antacids, or iron preparations within one hour of this medication.  Finish all this medication unless otherwise directed by prescriber.  May cause drowsiness or dizziness.  Medication should be taken with plenty of water.    -- Indication: For cap I will START or STAY ON the medications listed below when I get home from the hospital:    Proventil HFA 90 mcg/inh inhalation aerosol  -- 2 puff(s) inhaled 4 times a day, As Needed  -- Indication: For Asthma    Levaquin 750 mg oral tablet  -- 1 tab(s) by mouth once a day   -- Avoid prolonged or excessive exposure to direct and/or artificial sunlight while taking this medication.  Do not take dairy products, antacids, or iron preparations within one hour of this medication.  Finish all this medication unless otherwise directed by prescriber.  May cause drowsiness or dizziness.  Medication should be taken with plenty of water.    -- Indication: For cap

## 2018-01-16 NOTE — PROGRESS NOTE ADULT - PROBLEM SELECTOR PLAN 1
Patient reports cough with productive sputum, lethargy, MARS and URI type symptoms for 4 days PTA; PE pertinent for LLL crackles; CXR with LLL consolidation; patient adequately resuscitated at [facility]; MEETS SIRS CRITERIA WITH WBC, TACHYCARDIA AND RR; RVP negative for flu; Curb 65 is 0  - c/w ceftriaxone and azithromycin (1/13- ), discharge on Levaquin PO  - urine legionella neg  - f/u blood cultures (1/13)  - Tylenol PRN for fever  - f/u RVP Patient reports cough with productive sputum, lethargy, MARS and URI type symptoms for 4 days PTA; PE pertinent for LLL crackles; CXR with LLL consolidation; patient adequately resuscitated at [facility]; MEETS SIRS CRITERIA WITH WBC, TACHYCARDIA AND RR; RVP negative for flu; Curb 65 is 0  - c/w ceftriaxone and azithromycin (1/13- ), discharge on Levaquin PO  - urine legionella neg  - blood cultures (1/13) NGTD  - Tylenol PRN for fever  - RVP neg

## 2018-01-16 NOTE — DISCHARGE NOTE ADULT - PATIENT PORTAL LINK FT
“You can access the Follow[employer] Patient Portal, offered by [facility], by registering with the following website: [email or web address]”

## 2018-01-16 NOTE — PROGRESS NOTE ADULT - PROBLEM SELECTOR PLAN 3
Patient reports drinking 6-8 cans of beer a day; last drink was 9 pm friday  - c/w Librium 25 mg TID and Ativan 1 mg q2h PRN  - CIWA checks  - thiamine 100mg PO daily, folic acid 1mg PO daily Patient reports drinking 6-8 cans of beer a day; last drink was 9 pm friday  - off Librium now  - thiamine 100mg PO daily, folic acid 1mg PO daily

## 2018-01-16 NOTE — DISCHARGE NOTE ADULT - PLAN OF CARE
Continue antibiotics (Levaquin 750mg) once a day for 4 days You were found to have sepsis, or a severe infection, due to your pneumonia. We treated you with intravenous fluids and antibiotics to cover for community acquired pneumonia. You will be continuing Levaquin (an antibiotics for your pneumonia) once a day for 4 days starting today. Your cough and lethargy should improve even more at this time. If you experience worsening symptoms, please follow up with your primary physician or return to the hospital. You were diagnosed with a pneumonia, which is an infection of your lungs. We treated you with intravenous antibiotics while you were in the hospital to help you eliminate the bacterial infection. Continue antibiotics and instructions as above. You stated you drink about 6 beers a day. We were concerned about a possible alcohol withdrawal so we started you on Librium, a medication to help prevent withdrawal and withdrawal seizures. There are no medications to continue but please attempt to abstain from alcohol use. Please continue with your Proventil to help with your asthma. If you experience worsening of your breathing, please follow up with your doctor or return to the hospital.

## 2018-01-16 NOTE — PROGRESS NOTE ADULT - PROBLEM SELECTOR PLAN 8
Fluids: NS @ 80 c an hour  electrolytes: replete PRN  Nutrition: pureed diet ( missing her dentures)    Prophylactic measure: none  FULL CODE  DISPO: admit for IV abx Fluids: none  electrolytes: replete PRN  Nutrition: pureed diet ( missing her dentures)    Prophylactic measure: none  FULL CODE  DISPO: admit for IV abx

## 2018-01-16 NOTE — PROGRESS NOTE ADULT - ASSESSMENT
57F with a pmhx of asthma and extensive alcohol hx who is here presenting from [facility] with cough, dyspnea on exertion and lethargy, admitted for sepsis 2/2 CAP.

## 2018-01-16 NOTE — DISCHARGE NOTE ADULT - CARE PLAN
Principal Discharge DX:	Sepsis  Goal:	Continue antibiotics (Levaquin 750mg) once a day for 4 days  Assessment and plan of treatment:	You were found to have sepsis, or a severe infection, due to your pneumonia. We treated you with intravenous fluids and antibiotics to cover for community acquired pneumonia. You will be continuing Levaquin (an antibiotics for your pneumonia) once a day for 4 days starting today. Your cough and lethargy should improve even more at this time. If you experience worsening symptoms, please follow up with your primary physician or return to the hospital.  Secondary Diagnosis:	Pneumonia  Assessment and plan of treatment:	You were diagnosed with a pneumonia, which is an infection of your lungs. We treated you with intravenous antibiotics while you were in the hospital to help you eliminate the bacterial infection. Continue antibiotics and instructions as above.  Secondary Diagnosis:	Alcohol withdrawal syndrome with complication  Assessment and plan of treatment:	You stated you drink about 6 beers a day. We were concerned about a possible alcohol withdrawal so we started you on Librium, a medication to help prevent withdrawal and withdrawal seizures. There are no medications to continue but please attempt to abstain from alcohol use.  Secondary Diagnosis:	Asthma  Assessment and plan of treatment:	Please continue with your Proventil to help with your asthma. If you experience worsening of your breathing, please follow up with your doctor or return to the hospital.

## 2018-01-16 NOTE — PROGRESS NOTE ADULT - SUBJECTIVE AND OBJECTIVE BOX
OVERNIGHT EVENTS/SUBJECTIVE/ROS: Pt with productive coughs, ROS otherwise negative.    VITAL SIGNS:  Vital Signs Last 24 Hrs  T(C): 37 (16 Jan 2018 08:51), Max: 37.1 (15 Kole 2018 20:30)  T(F): 98.6 (16 Jan 2018 08:51), Max: 98.8 (15 Kole 2018 20:30)  HR: 111 (16 Jan 2018 08:51) (98 - 111)  BP: 104/68 (16 Jan 2018 08:51) (97/66 - 150/89)  BP(mean): 109 (15 Kole 2018 15:15) (109 - 109)  RR: 18 (16 Jan 2018 08:51) (16 - 20)  SpO2: 96% (16 Jan 2018 08:51) (96% - 100%)    PHYSICAL EXAM:    General: lying in bed, intermittent coughs, in NAD  HEENT: EOMI, anicteric  Neck: supple  Cardiovascular: S1, S2, RRR   Respiratory: decreased breath sounds LLL; otherwise good air movement  Gastrointestinal: soft NTND abdomen, +BS  Extremities: WWP, no peripheral edema  Vascular: 2+ pulses radial; DP/PT  Neurological: alert awake oriented    MEDICATIONS:  MEDICATIONS  (STANDING):  azithromycin   Tablet 250 milliGRAM(s) Oral daily  cefTRIAXone   IVPB 1 Gram(s) IV Intermittent every 24 hours  chlordiazePOXIDE 25 milliGRAM(s) Oral daily  folic acid 1 milliGRAM(s) Oral daily  heparin  Injectable 5000 Unit(s) SubCutaneous every 12 hours  influenza   Vaccine 0.5 milliLiter(s) IntraMuscular once  thiamine 100 milliGRAM(s) Oral daily    MEDICATIONS  (PRN):  acetaminophen   Tablet 650 milliGRAM(s) Oral every 6 hours PRN For Temp greater than 38 C (100.4 F)  acetaminophen   Tablet. 650 milliGRAM(s) Oral every 6 hours PRN Moderate Pain (4 - 6)  ALBUTerol/ipratropium for Nebulization 3 milliLiter(s) Nebulizer every 6 hours PRN Bronchospasm  LORazepam   Injectable 1 milliGRAM(s) IV Push every 2 hours PRN CIWA > 7      ALLERGIES:  Allergies    codeine (Unknown)    Intolerances        LABS:                        9.6    7.3   )-----------( 326      ( 16 Jan 2018 09:00 )             29.3     01-16    133<L>  |  95<L>  |  4<L>  ----------------------------<  104<H>  4.0   |  21<L>  |  0.51    Ca    9.8      16 Jan 2018 09:00  Phos  1.8     01-15  Mg     1.8     01-16    TPro  6.7  /  Alb  2.8<L>  /  TBili  x   /  DBili  x   /  AST  x   /  ALT  x   /  AlkPhos  x   01-14        CAPILLARY BLOOD GLUCOSE          RADIOLOGY & ADDITIONAL TESTS: Reviewed.

## 2018-01-16 NOTE — DISCHARGE NOTE ADULT - PROVIDER TOKENS
FREE:[LAST:[[facility]],PHONE:[(   )    -],FAX:[(   )    -],ADDRESS:[[facility]  Address: [address] [place] [address]  Phone: [phone]]]

## 2018-01-16 NOTE — PROGRESS NOTE ADULT - PROBLEM SELECTOR PLAN 4
Patent with gamma gap noted on exam; DDx includes infectious (HIV and Hep C) vs. malignancy (multiple myeloma) vs.  autoimmune diagnosis   - HIV neg, Hep C neg  - f/u SPEP, UPEP Patent with gamma gap noted on exam; DDx includes infectious (HIV and Hep C) vs. malignancy (multiple myeloma) vs.  autoimmune diagnosis   - HIV neg, Hep C neg  - SPEP, UPEP normal

## 2018-01-16 NOTE — DISCHARGE NOTE ADULT - HOSPITAL COURSE
57F undomiciled with PMH asthma, alcohol use disorder presenting with cough, AMRS and lethargy, admitted for sepsis (febrile, tachy, WBC 20) 2/2 CAP. Treated with fluids, ceftriaxone and azithromycin while inpatient with improvements in symptoms. Concern for alcohol withdrawal as pt with last drink 2 days prior to admission, started on Librium taper not requiring any Ativan pushes. Patient to continue Levaquin 750mg daily for 4 more days upon discharge to shelter.

## 2018-01-18 LAB
CULTURE RESULTS: SIGNIFICANT CHANGE UP
CULTURE RESULTS: SIGNIFICANT CHANGE UP
SPECIMEN SOURCE: SIGNIFICANT CHANGE UP
SPECIMEN SOURCE: SIGNIFICANT CHANGE UP

## 2018-01-19 DIAGNOSIS — E87.6 HYPOKALEMIA: ICD-10-CM

## 2018-01-19 DIAGNOSIS — R19.7 DIARRHEA, UNSPECIFIED: ICD-10-CM

## 2018-01-19 DIAGNOSIS — E43 UNSPECIFIED SEVERE PROTEIN-CALORIE MALNUTRITION: ICD-10-CM

## 2018-01-19 DIAGNOSIS — E83.42 HYPOMAGNESEMIA: ICD-10-CM

## 2018-01-19 DIAGNOSIS — J18.9 PNEUMONIA, UNSPECIFIED ORGANISM: ICD-10-CM

## 2018-01-19 DIAGNOSIS — E87.1 HYPO-OSMOLALITY AND HYPONATREMIA: ICD-10-CM

## 2018-01-19 DIAGNOSIS — R05 COUGH: ICD-10-CM

## 2018-01-19 DIAGNOSIS — F10.239 ALCOHOL DEPENDENCE WITH WITHDRAWAL, UNSPECIFIED: ICD-10-CM

## 2018-01-19 DIAGNOSIS — J45.909 UNSPECIFIED ASTHMA, UNCOMPLICATED: ICD-10-CM

## 2018-01-19 DIAGNOSIS — A41.9 SEPSIS, UNSPECIFIED ORGANISM: ICD-10-CM

## 2018-01-19 DIAGNOSIS — F10.10 ALCOHOL ABUSE, UNCOMPLICATED: ICD-10-CM

## 2018-01-19 DIAGNOSIS — D50.9 IRON DEFICIENCY ANEMIA, UNSPECIFIED: ICD-10-CM

## 2018-01-19 DIAGNOSIS — Z59.0 HOMELESSNESS: ICD-10-CM

## 2018-01-19 SDOH — ECONOMIC STABILITY - HOUSING INSECURITY: HOMELESSNESS: Z59.0

## 2018-01-29 ENCOUNTER — APPOINTMENT (OUTPATIENT)
Dept: INTERNAL MEDICINE | Facility: CLINIC | Age: 58
End: 2018-01-29

## 2018-04-16 ENCOUNTER — INPATIENT (INPATIENT)
Facility: HOSPITAL | Age: 58
LOS: 3 days | Discharge: NURSING, REHAB OR LONG-TERM CARE | DRG: 563 | End: 2018-04-20
Attending: STUDENT IN AN ORGANIZED HEALTH CARE EDUCATION/TRAINING PROGRAM | Admitting: STUDENT IN AN ORGANIZED HEALTH CARE EDUCATION/TRAINING PROGRAM
Payer: COMMERCIAL

## 2018-04-16 VITALS
SYSTOLIC BLOOD PRESSURE: 119 MMHG | OXYGEN SATURATION: 99 % | RESPIRATION RATE: 20 BRPM | TEMPERATURE: 98 F | HEART RATE: 75 BPM | DIASTOLIC BLOOD PRESSURE: 77 MMHG

## 2018-04-16 DIAGNOSIS — D64.9 ANEMIA, UNSPECIFIED: ICD-10-CM

## 2018-04-16 DIAGNOSIS — R63.8 OTHER SYMPTOMS AND SIGNS CONCERNING FOOD AND FLUID INTAKE: ICD-10-CM

## 2018-04-16 DIAGNOSIS — S82.209A UNSPECIFIED FRACTURE OF SHAFT OF UNSPECIFIED TIBIA, INITIAL ENCOUNTER FOR CLOSED FRACTURE: ICD-10-CM

## 2018-04-16 DIAGNOSIS — F10.10 ALCOHOL ABUSE, UNCOMPLICATED: ICD-10-CM

## 2018-04-16 DIAGNOSIS — J45.909 UNSPECIFIED ASTHMA, UNCOMPLICATED: ICD-10-CM

## 2018-04-16 LAB
ALBUMIN SERPL ELPH-MCNC: 3.1 G/DL — LOW (ref 3.4–5)
ALP SERPL-CCNC: 156 U/L — HIGH (ref 40–120)
ALT FLD-CCNC: 36 U/L — SIGNIFICANT CHANGE UP (ref 12–42)
ANION GAP SERPL CALC-SCNC: 14 MMOL/L — SIGNIFICANT CHANGE UP (ref 9–16)
AST SERPL-CCNC: 52 U/L — HIGH (ref 15–37)
BASOPHILS NFR BLD AUTO: 0.2 % — SIGNIFICANT CHANGE UP (ref 0–2)
BILIRUB SERPL-MCNC: 0.4 MG/DL — SIGNIFICANT CHANGE UP (ref 0.2–1.2)
BUN SERPL-MCNC: 5 MG/DL — LOW (ref 7–23)
CALCIUM SERPL-MCNC: 9.1 MG/DL — SIGNIFICANT CHANGE UP (ref 8.5–10.5)
CHLORIDE SERPL-SCNC: 101 MMOL/L — SIGNIFICANT CHANGE UP (ref 96–108)
CO2 SERPL-SCNC: 21 MMOL/L — LOW (ref 22–31)
CREAT SERPL-MCNC: 0.45 MG/DL — LOW (ref 0.5–1.3)
EOSINOPHIL NFR BLD AUTO: 0.4 % — SIGNIFICANT CHANGE UP (ref 0–6)
GLUCOSE SERPL-MCNC: 101 MG/DL — HIGH (ref 70–99)
HCT VFR BLD CALC: 27 % — LOW (ref 34.5–45)
HGB BLD-MCNC: 8.1 G/DL — LOW (ref 11.5–15.5)
IMM GRANULOCYTES NFR BLD AUTO: 0.4 % — SIGNIFICANT CHANGE UP (ref 0–1.5)
LYMPHOCYTES # BLD AUTO: 15.3 % — SIGNIFICANT CHANGE UP (ref 13–44)
MCHC RBC-ENTMCNC: 21.1 PG — LOW (ref 27–34)
MCHC RBC-ENTMCNC: 30 G/DL — LOW (ref 32–36)
MCV RBC AUTO: 70.3 FL — LOW (ref 80–100)
MONOCYTES NFR BLD AUTO: 13.5 % — SIGNIFICANT CHANGE UP (ref 2–14)
NEUTROPHILS NFR BLD AUTO: 70.2 % — SIGNIFICANT CHANGE UP (ref 43–77)
PLATELET # BLD AUTO: 183 K/UL — SIGNIFICANT CHANGE UP (ref 150–400)
POTASSIUM SERPL-MCNC: 4 MMOL/L — SIGNIFICANT CHANGE UP (ref 3.5–5.3)
POTASSIUM SERPL-SCNC: 4 MMOL/L — SIGNIFICANT CHANGE UP (ref 3.5–5.3)
PROT SERPL-MCNC: 8.3 G/DL — HIGH (ref 6.4–8.2)
RBC # BLD: 3.84 M/UL — SIGNIFICANT CHANGE UP (ref 3.8–5.2)
RBC # FLD: 18.5 % — HIGH (ref 10.3–16.9)
SODIUM SERPL-SCNC: 136 MMOL/L — SIGNIFICANT CHANGE UP (ref 132–145)
WBC # BLD: 5.2 K/UL — SIGNIFICANT CHANGE UP (ref 3.8–10.5)
WBC # FLD AUTO: 5.2 K/UL — SIGNIFICANT CHANGE UP (ref 3.8–10.5)

## 2018-04-16 PROCEDURE — 99285 EMERGENCY DEPT VISIT HI MDM: CPT | Mod: 25

## 2018-04-16 PROCEDURE — 73564 X-RAY EXAM KNEE 4 OR MORE: CPT | Mod: 26,RT

## 2018-04-16 PROCEDURE — 73700 CT LOWER EXTREMITY W/O DYE: CPT | Mod: 26,RT

## 2018-04-16 PROCEDURE — 99223 1ST HOSP IP/OBS HIGH 75: CPT | Mod: GC

## 2018-04-16 RX ORDER — IBUPROFEN 200 MG
600 TABLET ORAL ONCE
Qty: 0 | Refills: 0 | Status: COMPLETED | OUTPATIENT
Start: 2018-04-16 | End: 2018-04-16

## 2018-04-16 RX ORDER — ACETAMINOPHEN 500 MG
650 TABLET ORAL EVERY 6 HOURS
Qty: 0 | Refills: 0 | Status: DISCONTINUED | OUTPATIENT
Start: 2018-04-16 | End: 2018-04-16

## 2018-04-16 RX ORDER — THIAMINE MONONITRATE (VIT B1) 100 MG
100 TABLET ORAL DAILY
Qty: 0 | Refills: 0 | Status: DISCONTINUED | OUTPATIENT
Start: 2018-04-16 | End: 2018-04-20

## 2018-04-16 RX ORDER — ALBUTEROL 90 UG/1
2 AEROSOL, METERED ORAL
Qty: 0 | Refills: 0 | COMMUNITY

## 2018-04-16 RX ORDER — ACETAMINOPHEN 500 MG
650 TABLET ORAL EVERY 6 HOURS
Qty: 0 | Refills: 0 | Status: DISCONTINUED | OUTPATIENT
Start: 2018-04-16 | End: 2018-04-20

## 2018-04-16 RX ORDER — CEPHALEXIN 500 MG
1 CAPSULE ORAL
Qty: 14 | Refills: 0 | OUTPATIENT
Start: 2018-04-16 | End: 2018-04-22

## 2018-04-16 RX ORDER — MORPHINE SULFATE 50 MG/1
2 CAPSULE, EXTENDED RELEASE ORAL ONCE
Qty: 0 | Refills: 0 | Status: DISCONTINUED | OUTPATIENT
Start: 2018-04-16 | End: 2018-04-16

## 2018-04-16 RX ORDER — ONDANSETRON 8 MG/1
4 TABLET, FILM COATED ORAL ONCE
Qty: 0 | Refills: 0 | Status: COMPLETED | OUTPATIENT
Start: 2018-04-16 | End: 2018-04-16

## 2018-04-16 RX ORDER — MORPHINE SULFATE 50 MG/1
2 CAPSULE, EXTENDED RELEASE ORAL EVERY 6 HOURS
Qty: 0 | Refills: 0 | Status: DISCONTINUED | OUTPATIENT
Start: 2018-04-16 | End: 2018-04-16

## 2018-04-16 RX ORDER — CEPHALEXIN 500 MG
500 CAPSULE ORAL ONCE
Qty: 0 | Refills: 0 | Status: COMPLETED | OUTPATIENT
Start: 2018-04-16 | End: 2018-04-16

## 2018-04-16 RX ORDER — ACETAMINOPHEN 500 MG
975 TABLET ORAL ONCE
Qty: 0 | Refills: 0 | Status: COMPLETED | OUTPATIENT
Start: 2018-04-16 | End: 2018-04-16

## 2018-04-16 RX ORDER — KETOROLAC TROMETHAMINE 30 MG/ML
30 SYRINGE (ML) INJECTION ONCE
Qty: 0 | Refills: 0 | Status: DISCONTINUED | OUTPATIENT
Start: 2018-04-16 | End: 2018-04-16

## 2018-04-16 RX ORDER — LIDOCAINE 4 G/100G
1 CREAM TOPICAL DAILY
Qty: 0 | Refills: 0 | Status: DISCONTINUED | OUTPATIENT
Start: 2018-04-16 | End: 2018-04-20

## 2018-04-16 RX ORDER — FOLIC ACID 0.8 MG
1 TABLET ORAL DAILY
Qty: 0 | Refills: 0 | Status: DISCONTINUED | OUTPATIENT
Start: 2018-04-16 | End: 2018-04-20

## 2018-04-16 RX ORDER — ONDANSETRON 8 MG/1
4 TABLET, FILM COATED ORAL EVERY 12 HOURS
Qty: 0 | Refills: 0 | Status: DISCONTINUED | OUTPATIENT
Start: 2018-04-16 | End: 2018-04-20

## 2018-04-16 RX ORDER — IBUPROFEN 200 MG
400 TABLET ORAL EVERY 6 HOURS
Qty: 0 | Refills: 0 | Status: DISCONTINUED | OUTPATIENT
Start: 2018-04-16 | End: 2018-04-16

## 2018-04-16 RX ADMIN — MORPHINE SULFATE 2 MILLIGRAM(S): 50 CAPSULE, EXTENDED RELEASE ORAL at 11:18

## 2018-04-16 RX ADMIN — Medication 1 MILLIGRAM(S): at 21:39

## 2018-04-16 RX ADMIN — ONDANSETRON 4 MILLIGRAM(S): 8 TABLET, FILM COATED ORAL at 03:25

## 2018-04-16 RX ADMIN — Medication 400 MILLIGRAM(S): at 21:44

## 2018-04-16 RX ADMIN — Medication 30 MILLIGRAM(S): at 10:15

## 2018-04-16 RX ADMIN — Medication 600 MILLIGRAM(S): at 08:01

## 2018-04-16 RX ADMIN — MORPHINE SULFATE 2 MILLIGRAM(S): 50 CAPSULE, EXTENDED RELEASE ORAL at 10:14

## 2018-04-16 RX ADMIN — Medication 400 MILLIGRAM(S): at 20:44

## 2018-04-16 RX ADMIN — ONDANSETRON 4 MILLIGRAM(S): 8 TABLET, FILM COATED ORAL at 17:45

## 2018-04-16 RX ADMIN — LIDOCAINE 1 PATCH: 4 CREAM TOPICAL at 22:07

## 2018-04-16 RX ADMIN — MORPHINE SULFATE 2 MILLIGRAM(S): 50 CAPSULE, EXTENDED RELEASE ORAL at 10:15

## 2018-04-16 RX ADMIN — Medication 975 MILLIGRAM(S): at 08:01

## 2018-04-16 RX ADMIN — MORPHINE SULFATE 2 MILLIGRAM(S): 50 CAPSULE, EXTENDED RELEASE ORAL at 13:42

## 2018-04-16 RX ADMIN — Medication 600 MILLIGRAM(S): at 02:56

## 2018-04-16 RX ADMIN — Medication 650 MILLIGRAM(S): at 22:39

## 2018-04-16 RX ADMIN — Medication 975 MILLIGRAM(S): at 07:46

## 2018-04-16 RX ADMIN — Medication 100 MILLIGRAM(S): at 21:39

## 2018-04-16 RX ADMIN — Medication 650 MILLIGRAM(S): at 21:39

## 2018-04-16 RX ADMIN — Medication 30 MILLIGRAM(S): at 10:14

## 2018-04-16 RX ADMIN — Medication 500 MILLIGRAM(S): at 02:56

## 2018-04-16 NOTE — H&P ADULT - PROBLEM SELECTOR PLAN 6
F: No IVF  E: Replete PRN  N: Regular diet  VTE PPX: IMPROVE 1, no chemo ppx indicated    FULL CODE  DISPO: GUSTAVO Well controlled without medications  -continue to monitor

## 2018-04-16 NOTE — H&P ADULT - PROBLEM SELECTOR PLAN 5
F: No IVF  E: Replete PRN  N: Regular diet  VTE PPX: IMPROVE 1, no chemo ppx indicated    FULL CODE  DISPO: GUSTAVO Well controlled without medications  -continue to monitor nonblanching, nontender erythema noticed by patient at the left medial ankle region; cause is unclear, denies trauma, will monitor for worsening sxs. check coags.
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
## 2018-04-16 NOTE — ED PROVIDER NOTE - NS ED ROS FT
· CONSTITUTIONAL: no fever and no chills.  · CARDIOVASCULAR: normal rate and rhythm, no chest pain and no edema.  · RESPIRATORY: no chest pain, no cough, and no shortness of breath.  · GASTROINTESTINAL: no abdominal pain, no bloating, no constipation, no diarrhea, no nausea and no vomiting.  · MUSCULOSKELETAL: +right knee pain, no back pain, no musculoskeletal pain, no neck pain, and no weakness.  · SKIN: no abrasions, no jaundice, no lesions, no pruritis, and no rashes.  · NEURO: no loss of consciousness, no gait abnormality, no headache, no sensory deficits, and no weakness.  · PSYCHIATRIC: no known mental health issues.

## 2018-04-16 NOTE — H&P ADULT - PROBLEM SELECTOR PLAN 3
Well controlled without medications  -continue to monitor Microcytic anemia; Iron studies from Jan 2018 suggestive of ACD, likely in setting of EtOH abuse. Hgb 8.1 on admission with baseline hgb 8-9  - continue to monitor

## 2018-04-16 NOTE — H&P ADULT - NSHPPHYSICALEXAM_GEN_ALL_CORE
VITAL SIGNS:  T(C): 36.1 (04-16-18 @ 15:12), Max: 36.7 (04-16-18 @ 05:59)  T(F): 97 (04-16-18 @ 15:12), Max: 98 (04-16-18 @ 05:59)  HR: 93 (04-16-18 @ 15:12) (75 - 98)  BP: 145/81 (04-16-18 @ 15:12) (110/60 - 145/81)  RR: 18 (04-16-18 @ 15:12) (16 - 20)  SpO2: 100% (04-16-18 @ 15:12) (97% - 100%)    PHYSICAL EXAM:    Constitutional: resting comfortably in bed; NAD  Head: NC/AT  Eyes: PERRL, EOMI, anicteric sclera  ENT: no nasal discharge; uvula midline, no oropharyngeal erythema or exudates; MMM  Neck: supple; no JVD or thyromegaly  Respiratory: CTA B/L; no W/R/R  Cardiac: +S1/S2; RRR; no M/R/G; PMI non-displaced  Gastrointestinal: soft, NT/ND; no rebound or guarding; +BSx4  Genitourinary: no sorto  Back: spine midline, no bony tenderness or step-offs; no CVAT B/L  Extremities: WWP, no clubbing or cyanosis; no peripheral edema  Musculoskeletal: NROM x4; no joint swelling, tenderness or erythema  Vascular: 2+ distal pulses B/L  Dermatologic: skin warm, dry and intact; no rashes  Lymphatic: no submandibular or cervical LAD  Neurologic: AAOx3; CNII-XII grossly intact; no focal deficits  Psychiatric: affect and characteristics of appearance, verbalizations, behaviors are appropriate
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
## 2018-04-16 NOTE — ED PROVIDER NOTE - OBJECTIVE STATEMENT
57 year old female with h/o asthma, alcohol abuse, presents with right knee pain x few hours. [place] and [place] her in the right knee. pain to all around her knee. reports able to ambulate but with pain.   Denies head trauma, LOC, break in the skin, paresthesia, numbness, tingling, redness, bleeding, d/c, HA, dizziness, SOB, CP, palpitations, N/V, focal weakness, and malaise.

## 2018-04-16 NOTE — ED PROVIDER NOTE - PHYSICAL EXAMINATION
VITAL SIGNS: I have reviewed nursing notes and confirm.  CONSTITUTIONAL: Well-developed; well-nourished; in no acute distress.  SKIN: Skin is warm and dry, no acute rash.  HEAD: Normocephalic; atraumatic.  EYES: PERRL, EOM intact; conjunctiva and sclera clear.  ENT: No nasal discharge; airway clear.  NECK: Supple; non tender.  CARD: S1, S2 normal; no murmurs, gallops, or rubs. Regular rate and rhythm.  RESP: No wheezes, rales or rhonchi.  ABD: Normal bowel sounds; soft; non-distended; non-tender; no hepatosplenomegaly.  EXT: RIGHT KNEE: no swelling, diffuse tenderness, ROM intact, sensation intact, pulses intact. active ROM normal, Anterior Drawer NEG, Lachman NEG, Valgus stress NEG, Varus stress NEG, Tara test NEG, Apley Grind test NEG  OTHER: Normal ROM. No clubbing, cyanosis or edema.  NEURO: Alert, oriented. Grossly unremarkable.  PSYCH: Cooperative, appropriate.
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
## 2018-04-16 NOTE — ED PROVIDER NOTE - CARE PLAN
Principal Discharge DX:	Acute pain of right knee Principal Discharge DX:	Acute pain of right knee  Secondary Diagnosis:	Tibia fracture
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
## 2018-04-16 NOTE — H&P ADULT - PROBLEM SELECTOR PLAN 2
Pt reported 6 beers a day, last drink evening prior to admission. Hx of tremors, nausea ,tremors, sweating, nausea, anxiousness days without EtOH. CIWA currently 0.  -CIWA q4hr Pt reported 6 beers a day, last drink evening prior to admission. Hx of tremors, nausea ,tremors, sweating, nausea, anxiousness days without EtOH. CIWA currently 1 for nausea  -CIWA q4hr  -c/w zofran 4mg IVP q12hr for nausea  -ativan for CIWA>8 Pt reported 6 beers a day, last drink evening prior to admission. Hx of tremors, nausea ,tremors, sweating, nausea, anxiousness days without EtOH. CIWA currently 1 for nausea  -CIWA q4hr  -c/w zofran 4mg IVP q12hr for nausea  -ativan for CIWA>8  -c/w folic acid, thiamine, MV Pt reported 6 beers a day, last drink evening prior to admission. Hx of tremors, nausea, sweating, nausea, anxiousness days without EtOH. CIWA currently 1 for nausea  -CIWA q4hr  -c/w zofran 4mg IVP q12hr for nausea  -ativan for CIWA>8  -c/w folic acid, thiamine, MV
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
## 2018-04-16 NOTE — PATIENT PROFILE ADULT. - AS SC BRADEN FRICTION
Pathology reviewed:  Benign .scant tissue atropic     Will discuss plan with patient at follow up visit     Joel Goode M.D.   OB/GYN    10/28/2017    
Pt notified and verbalized understanding  
(2) potential problem

## 2018-04-16 NOTE — H&P ADULT - NSHPSOCIALHISTORY_GEN_ALL_CORE
Denied tobacco use  6 beers a day for 10 years  Former crack cocaine user, quit 10 years ago    Pt homeless; lives at [facility] w/, who is crack addict  Twin sister  from overdose  Pt has two children, but pt does not care for them
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
## 2018-04-16 NOTE — ED PROVIDER NOTE - PROGRESS NOTE DETAILS
imaging reviewed with patient. negative. requesting to stay and sleep longer as she is homeless patient "vomit/spits" into bag. states does not want to talk to me about why she is vomiting. will give zofran 4mg PO patient asleep. given food. tried to discharge patient. she is refusing to ambulate. states she can't put weight on her knee. will get CT. discharge held. CT results reviewed. Call placed to on call ortho. CT results reviewed. Call placed to on call ortho.  patient placed in knee immobilizer. call placed to transfer center. have to wait till after their hand off to speak to hospitalist. case discussed with Dr. Gan. accepts for admission to general medicine. will be awhile for bed.

## 2018-04-16 NOTE — H&P ADULT - PROBLEM SELECTOR PLAN 4
Microcytic anemia; Iron studies from Jan 2018 suggestive of ACD, likely in setting of EtOH abuse. Hgb 8.1 on admission with baseline hgb 8-9  - continue to monitor hx of ETOH abuse, no abdominal tenderness on palpation; will check hepatitis studies; monitor LFTs; will need outpatient imaging studies
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
## 2018-04-16 NOTE — H&P ADULT - NSHPLABSRESULTS_GEN_ALL_CORE
LABS:                         8.1    5.2   )-----------( 183      ( 16 Apr 2018 08:59 )             27.0     04-16    136  |  101  |  5<L>  ----------------------------<  101<H>  4.0   |  21<L>  |  0.45<L>    Ca    9.1      16 Apr 2018 08:59    TPro  8.3<H>  /  Alb  3.1<L>  /  TBili  0.4  /  DBili  x   /  AST  52<H>  /  ALT  36  /  AlkPhos  156<H>  04-16                  RADIOLOGY, EKG & ADDITIONAL TESTS: Reviewed.   < from: CT Knee No Cont, Right (04.16.18 @ 06:46) >    Impression: Depressed fracture of the lateral tibial plateau and avulsion   of the medial tibial spine. Findings may be the sequela of ACL type   injury. Correlation for evidence of ligamentous instability is   recommended.    < end of copied text >

## 2018-04-16 NOTE — H&P ADULT - PROBLEM SELECTOR PLAN 7
F: No IVF  E: Replete PRN  N: Regular diet  VTE PPX: IMPROVE 1, no chemo ppx indicated    FULL CODE  DISPO: GUSATVO

## 2018-04-16 NOTE — ED PROVIDER NOTE - NOTES
case discussed. case discussed. nonoperable. toe touch weight bearing with crutches. given patient unable to use crutches. suggest admit to medicine.

## 2018-04-17 DIAGNOSIS — R79.89 OTHER SPECIFIED ABNORMAL FINDINGS OF BLOOD CHEMISTRY: ICD-10-CM

## 2018-04-17 DIAGNOSIS — L53.9 ERYTHEMATOUS CONDITION, UNSPECIFIED: ICD-10-CM

## 2018-04-17 LAB
ALBUMIN SERPL ELPH-MCNC: 3.6 G/DL — SIGNIFICANT CHANGE UP (ref 3.3–5)
ALP SERPL-CCNC: 128 U/L — HIGH (ref 40–120)
ALT FLD-CCNC: 17 U/L — SIGNIFICANT CHANGE UP (ref 10–45)
ANION GAP SERPL CALC-SCNC: 12 MMOL/L — SIGNIFICANT CHANGE UP (ref 5–17)
APTT BLD: 25.2 SEC — LOW (ref 27.5–37.4)
AST SERPL-CCNC: 26 U/L — SIGNIFICANT CHANGE UP (ref 10–40)
BILIRUB SERPL-MCNC: 0.5 MG/DL — SIGNIFICANT CHANGE UP (ref 0.2–1.2)
BUN SERPL-MCNC: 6 MG/DL — LOW (ref 7–23)
CALCIUM SERPL-MCNC: 9.4 MG/DL — SIGNIFICANT CHANGE UP (ref 8.4–10.5)
CHLORIDE SERPL-SCNC: 96 MMOL/L — SIGNIFICANT CHANGE UP (ref 96–108)
CO2 SERPL-SCNC: 25 MMOL/L — SIGNIFICANT CHANGE UP (ref 22–31)
CREAT SERPL-MCNC: 0.42 MG/DL — LOW (ref 0.5–1.3)
FERRITIN SERPL-MCNC: 30 NG/ML — SIGNIFICANT CHANGE UP (ref 15–150)
GLUCOSE SERPL-MCNC: 95 MG/DL — SIGNIFICANT CHANGE UP (ref 70–99)
HAV IGG+IGM SER QL: REACTIVE
HBV CORE IGM SER-ACNC: SIGNIFICANT CHANGE UP
HBV SURFACE AB SER-ACNC: SIGNIFICANT CHANGE UP
HBV SURFACE AG SER-ACNC: SIGNIFICANT CHANGE UP
HCT VFR BLD CALC: 26.6 % — LOW (ref 34.5–45)
HCV AB S/CO SERPL IA: 0.13 S/CO — SIGNIFICANT CHANGE UP
HCV AB SERPL-IMP: SIGNIFICANT CHANGE UP
HGB BLD-MCNC: 7.9 G/DL — LOW (ref 11.5–15.5)
INR BLD: 1.11 — SIGNIFICANT CHANGE UP (ref 0.88–1.16)
IRON SATN MFR SERPL: 19 UG/DL — LOW (ref 30–160)
IRON SATN MFR SERPL: 5 % — LOW (ref 14–50)
MAGNESIUM SERPL-MCNC: 1.6 MG/DL — SIGNIFICANT CHANGE UP (ref 1.6–2.6)
MCHC RBC-ENTMCNC: 20.9 PG — LOW (ref 27–34)
MCHC RBC-ENTMCNC: 29.7 G/DL — LOW (ref 32–36)
MCV RBC AUTO: 70.4 FL — LOW (ref 80–100)
OB PNL STL: NEGATIVE — SIGNIFICANT CHANGE UP
PLATELET # BLD AUTO: 193 K/UL — SIGNIFICANT CHANGE UP (ref 150–400)
POTASSIUM SERPL-MCNC: 3.5 MMOL/L — SIGNIFICANT CHANGE UP (ref 3.5–5.3)
POTASSIUM SERPL-SCNC: 3.5 MMOL/L — SIGNIFICANT CHANGE UP (ref 3.5–5.3)
PROT SERPL-MCNC: 7.6 G/DL — SIGNIFICANT CHANGE UP (ref 6–8.3)
PROTHROM AB SERPL-ACNC: 12.3 SEC — SIGNIFICANT CHANGE UP (ref 9.8–12.7)
RBC # BLD: 3.77 M/UL — LOW (ref 3.8–5.2)
RBC # BLD: 3.78 M/UL — LOW (ref 3.8–5.2)
RBC # FLD: 18.2 % — HIGH (ref 10.3–16.9)
RETICS/RBC NFR: 3.8 % — HIGH (ref 0.5–2.5)
SODIUM SERPL-SCNC: 133 MMOL/L — LOW (ref 135–145)
TIBC SERPL-MCNC: 362 UG/DL — SIGNIFICANT CHANGE UP (ref 220–430)
TRANSFERRIN SERPL-MCNC: 303 MG/DL — SIGNIFICANT CHANGE UP (ref 200–360)
UIBC SERPL-MCNC: 343 UG/DL — SIGNIFICANT CHANGE UP (ref 110–370)
WBC # BLD: 4.2 K/UL — SIGNIFICANT CHANGE UP (ref 3.8–10.5)
WBC # FLD AUTO: 4.2 K/UL — SIGNIFICANT CHANGE UP (ref 3.8–10.5)

## 2018-04-17 PROCEDURE — 99233 SBSQ HOSP IP/OBS HIGH 50: CPT | Mod: GC

## 2018-04-17 PROCEDURE — 99221 1ST HOSP IP/OBS SF/LOW 40: CPT

## 2018-04-17 PROCEDURE — 93010 ELECTROCARDIOGRAM REPORT: CPT

## 2018-04-17 PROCEDURE — 99222 1ST HOSP IP/OBS MODERATE 55: CPT | Mod: GC

## 2018-04-17 RX ORDER — POTASSIUM CHLORIDE 20 MEQ
20 PACKET (EA) ORAL ONCE
Qty: 0 | Refills: 0 | Status: COMPLETED | OUTPATIENT
Start: 2018-04-17 | End: 2018-04-17

## 2018-04-17 RX ORDER — HEPARIN SODIUM 5000 [USP'U]/ML
5000 INJECTION INTRAVENOUS; SUBCUTANEOUS EVERY 12 HOURS
Qty: 0 | Refills: 0 | Status: DISCONTINUED | OUTPATIENT
Start: 2018-04-17 | End: 2018-04-20

## 2018-04-17 RX ORDER — MAGNESIUM SULFATE 500 MG/ML
2 VIAL (ML) INJECTION ONCE
Qty: 0 | Refills: 0 | Status: COMPLETED | OUTPATIENT
Start: 2018-04-17 | End: 2018-04-17

## 2018-04-17 RX ORDER — POTASSIUM CHLORIDE 20 MEQ
40 PACKET (EA) ORAL ONCE
Qty: 0 | Refills: 0 | Status: COMPLETED | OUTPATIENT
Start: 2018-04-17 | End: 2018-04-17

## 2018-04-17 RX ORDER — FAMOTIDINE 10 MG/ML
40 INJECTION INTRAVENOUS
Qty: 0 | Refills: 0 | Status: DISCONTINUED | OUTPATIENT
Start: 2018-04-17 | End: 2018-04-20

## 2018-04-17 RX ORDER — IBUPROFEN 200 MG
600 TABLET ORAL EVERY 8 HOURS
Qty: 0 | Refills: 0 | Status: DISCONTINUED | OUTPATIENT
Start: 2018-04-17 | End: 2018-04-20

## 2018-04-17 RX ADMIN — FAMOTIDINE 40 MILLIGRAM(S): 10 INJECTION INTRAVENOUS at 17:58

## 2018-04-17 RX ADMIN — Medication 600 MILLIGRAM(S): at 12:30

## 2018-04-17 RX ADMIN — Medication 1 MILLIGRAM(S): at 11:28

## 2018-04-17 RX ADMIN — Medication 650 MILLIGRAM(S): at 05:49

## 2018-04-17 RX ADMIN — HEPARIN SODIUM 5000 UNIT(S): 5000 INJECTION INTRAVENOUS; SUBCUTANEOUS at 17:58

## 2018-04-17 RX ADMIN — Medication 600 MILLIGRAM(S): at 21:33

## 2018-04-17 RX ADMIN — Medication 600 MILLIGRAM(S): at 22:33

## 2018-04-17 RX ADMIN — Medication 50 GRAM(S): at 10:13

## 2018-04-17 RX ADMIN — LIDOCAINE 1 PATCH: 4 CREAM TOPICAL at 21:33

## 2018-04-17 RX ADMIN — LIDOCAINE 1 PATCH: 4 CREAM TOPICAL at 10:20

## 2018-04-17 RX ADMIN — Medication 40 MILLIEQUIVALENT(S): at 10:13

## 2018-04-17 RX ADMIN — Medication 600 MILLIGRAM(S): at 11:28

## 2018-04-17 RX ADMIN — HEPARIN SODIUM 5000 UNIT(S): 5000 INJECTION INTRAVENOUS; SUBCUTANEOUS at 05:49

## 2018-04-17 RX ADMIN — Medication 1 TABLET(S): at 11:28

## 2018-04-17 RX ADMIN — Medication 100 MILLIGRAM(S): at 11:28

## 2018-04-17 RX ADMIN — Medication 20 MILLIEQUIVALENT(S): at 12:26

## 2018-04-17 NOTE — DISCHARGE NOTE ADULT - PROVIDER TOKENS
FREE:[LAST:[[facility]],PHONE:[(   )    -],FAX:[(   )    -]]

## 2018-04-17 NOTE — DISCHARGE NOTE ADULT - ADDITIONAL INSTRUCTIONS
Please follow up with ___________________ within ______________ of discharge. 1) Please follow up your PCP after discharge from rehab    2) You can also go to [facility] clinic after rehab  Address: [facility] [address] [facility] [address]  Phone: [phone]

## 2018-04-17 NOTE — PROGRESS NOTE ADULT - PROBLEM SELECTOR PLAN 4
hx of ETOH abuse, no abdominal tenderness on palpation; will check hepatitis studies; monitor LFTs; will need outpatient imaging studies hx of ETOH abuse, no abdominal tenderness on palpation; monitor LFTs; will need outpatient imaging studies  - hep panel negative
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
## 2018-04-17 NOTE — PROGRESS NOTE ADULT - SUBJECTIVE AND OBJECTIVE BOX
OVERNIGHT EVENTS: DARLENE    SUBJECTIVE / INTERVAL HPI: Patient seen and examined at bedside. NAD    VITAL SIGNS:  Vital Signs Last 24 Hrs  T(C): 36.4 (17 Apr 2018 08:57), Max: 36.4 (17 Apr 2018 08:57)  T(F): 97.5 (17 Apr 2018 08:57), Max: 97.5 (17 Apr 2018 08:57)  HR: 88 (17 Apr 2018 08:57) (75 - 98)  BP: 118/53 (17 Apr 2018 08:57) (97/56 - 119/71)  BP(mean): --  RR: 16 (17 Apr 2018 08:57) (16 - 17)  SpO2: 99% (17 Apr 2018 08:57) (95% - 100%)    PHYSICAL EXAM:    General: WDWN  HEENT: NC/AT; PERRL, anicteric sclera; MMM  Neck: supple  Cardiovascular: +S1/S2; RRR  Respiratory: CTA B/L; no W/R/R  Gastrointestinal: soft, NT/ND; +BSx4  Extremities: RLE ace wrap   Vascular: 2+ radial, DP/PT pulses B/L  Neurological: AAOx3; no focal deficits    MEDICATIONS:  MEDICATIONS  (STANDING):  famotidine    Tablet 40 milliGRAM(s) Oral two times a day  folic acid 1 milliGRAM(s) Oral daily  heparin  Injectable 5000 Unit(s) SubCutaneous every 12 hours  lidocaine   Patch 1 Patch Transdermal daily  multivitamin 1 Tablet(s) Oral daily  thiamine 100 milliGRAM(s) Oral daily    MEDICATIONS  (PRN):  acetaminophen   Tablet. 650 milliGRAM(s) Oral every 6 hours PRN Moderate Pain (4 - 6)  ibuprofen  Tablet 600 milliGRAM(s) Oral every 8 hours PRN Pain  ondansetron Injectable 4 milliGRAM(s) IV Push every 12 hours PRN Nausea and/or Vomiting      ALLERGIES:  Allergies    codeine (Unknown)    Intolerances        LABS:                        7.9    4.2   )-----------( 193      ( 17 Apr 2018 07:10 )             26.6     04-17    133<L>  |  96  |  6<L>  ----------------------------<  95  3.5   |  25  |  0.42<L>    Ca    9.4      17 Apr 2018 07:11  Mg     1.6     04-17    TPro  7.6  /  Alb  3.6  /  TBili  0.5  /  DBili  x   /  AST  26  /  ALT  17  /  AlkPhos  128<H>  04-17    PT/INR - ( 17 Apr 2018 07:10 )   PT: 12.3 sec;   INR: 1.11          PTT - ( 17 Apr 2018 07:10 )  PTT:25.2 sec    CAPILLARY BLOOD GLUCOSE          RADIOLOGY & ADDITIONAL TESTS: Reviewed.    ASSESSMENT:    PLAN:

## 2018-04-17 NOTE — PHYSICAL THERAPY INITIAL EVALUATION ADULT - ACTIVE RANGE OF MOTION EXAMINATION, REHAB EVAL
Left LE Active ROM was WFL (within functional limits)/RLE limited by immobilizer/bilateral upper extremity Active ROM was WFL (within functional limits)

## 2018-04-17 NOTE — DISCHARGE NOTE ADULT - CARE PLAN
Principal Discharge DX:	Tibia fracture  Goal:	Pain control and orthopedic follow up  Assessment and plan of treatment:	You were found to have a fracture of one of the bones in your leg (tibia) after trauma to your leg. Your pain has been well controlled. Please continue the medications we have indicated and follow up as an outpatient.  Secondary Diagnosis:	Anemia  Assessment and plan of treatment:	On your admission labs, you were noted to have anemia (low red blood cell counts) which appears to be chronic based on past blood counts. This is partially due to alcohol consumption. Please abstain from alcohol consumption. If you experience any chest pain, palpitations, or severe shortness of breath, please go to the emergency room.  Secondary Diagnosis:	Alcohol abuse  Assessment and plan of treatment:	Please abstain from using alcohol.  Secondary Diagnosis:	Mild intermittent asthma without complication  Assessment and plan of treatment:	You have a history of asthma but do not take any medications. Please follow up with your primary care provider for any further care. Principal Discharge DX:	Tibia fracture  Goal:	Pain control and orthopedic follow up  Assessment and plan of treatment:	You were found to have a fracture of one of the bones in your leg (tibia) after trauma to your leg. Your pain has been well controlled. Please continue the medications we have indicated and follow up as an outpatient.  Secondary Diagnosis:	Anemia  Assessment and plan of treatment:	On your admission labs, you were noted to have anemia (low red blood cell counts) which appears to be chronic based on past blood counts. This is partially due to alcohol consumption. Please abstain from alcohol consumption. If you experience any chest pain, palpitations, or severe shortness of breath, please go to the emergency room.  - There is also a component of Iron deficiency so we have started you on Iron supplementation with.... Please also followup with your PCP and a Gastroenterologist to rule out any bleeding that might cause anemia  Secondary Diagnosis:	Alcohol abuse  Assessment and plan of treatment:	Please abstain from using alcohol.  Secondary Diagnosis:	Mild intermittent asthma without complication  Assessment and plan of treatment:	You have a history of asthma but do not take any medications. Please follow up with your primary care provider for any further care. Principal Discharge DX:	Tibia fracture  Goal:	Pain control and orthopedic follow up  Assessment and plan of treatment:	You were found to have a fracture of one of the bones in your leg (tibia) and possible ACL injury after trauma to your leg. Your pain has been well controlled. Please continue the pain medications while in rehab  - Please do not bear any weight on your leg and use crutches until you see Orthopedics team in 2-3 weeks.  - Please follow up as an outpatient with [phone] to make appointment with [facility] for 2-3 weeks from now  Secondary Diagnosis:	Anemia  Assessment and plan of treatment:	On your admission labs, you were noted to have anemia (low red blood cell counts) which appears to be chronic based on past blood counts. This is partially due to alcohol consumption. Please abstain from alcohol consumption. If you experience any chest pain, palpitations, or severe shortness of breath, please go to the emergency room.  - There is also a component of Iron deficiency so we have started you on Iron supplementation with Ferrous Sulfate 325 three times a day. Please also followup with your PCP and a Gastroenterologist to rule out any bleeding that might cause anemia  Secondary Diagnosis:	Alcohol abuse  Assessment and plan of treatment:	Please abstain from using alcohol.  Secondary Diagnosis:	Mild intermittent asthma without complication  Assessment and plan of treatment:	You have a history of asthma but do not take any medications. Please follow up with your primary care provider for any further care.  Secondary Diagnosis:	Hepatitis A  Assessment and plan of treatment:	You tested positive for Hepatitis A and some of your liver labs were abnormal. This should resolve on own however you should recheck liver labs with your PCP for confirm normal function Principal Discharge DX:	Tibia fracture  Goal:	Right tibia fracture. Pain control and orthopedic follow up  Assessment and plan of treatment:	You were found to have a fracture of one of the bones in your leg (tibia) and possible ACL injury after trauma to your leg. Your pain has been well controlled. Please continue the pain medications while in rehab  - Please do not bear any weight on your right leg and use crutches until you see Orthopedics team in 2-3 weeks.  - Please follow up as an outpatient with [phone] to make appointment with [facility] for 2-3 weeks from now. Please keep your right leg immobilizer on until then.  Secondary Diagnosis:	Anemia  Assessment and plan of treatment:	On your admission labs, you were noted to have anemia (low red blood cell counts) which appears to be chronic based on past blood counts. This is partially due to alcohol consumption. Please abstain from alcohol consumption. If you experience any chest pain, palpitations, or severe shortness of breath, please go to the emergency room.  - There is also a component of Iron deficiency so we have started you on Iron supplementation with Ferrous Sulfate 325 three times a day. Please also followup with your PCP and a Gastroenterologist to rule out any bleeding that might cause anemia  Secondary Diagnosis:	Alcohol abuse  Assessment and plan of treatment:	Please abstain from using alcohol.  Secondary Diagnosis:	Mild intermittent asthma without complication  Assessment and plan of treatment:	You have a history of asthma but do not take any medications. Please follow up with your primary care provider for any further care.  Secondary Diagnosis:	Hepatitis A  Assessment and plan of treatment:	You tested positive for Hepatitis A and some of your liver labs were abnormal. This should resolve on own however you should recheck liver labs with your PCP for confirm normal function

## 2018-04-17 NOTE — PROGRESS NOTE ADULT - PROBLEM SELECTOR PLAN 5
nonblanching, nontender erythema noticed by patient at the left medial ankle region; cause is unclear, denies trauma, will monitor for worsening sxs. check coags. nonblanching, nontender erythema noticed by patient at the left medial ankle region; cause is unclear, denies trauma, will monitor for worsening sxs.   - coags noell

## 2018-04-17 NOTE — DISCHARGE NOTE ADULT - INSTRUCTIONS
Resume your regular diet. Please take folate and multivitamin supplements. Abstain from using alcohol.

## 2018-04-17 NOTE — DISCHARGE NOTE ADULT - MEDICATION SUMMARY - MEDICATIONS TO TAKE
I will START or STAY ON the medications listed below when I get home from the hospital:    acetaminophen 325 mg oral tablet  -- 2 tab(s) by mouth every 6 hours, As needed, Moderate Pain (4 - 6)  -- Indication: For Tibia fracture    ibuprofen 600 mg oral tablet  -- 1 tab(s) by mouth every 8 hours, As needed, Pain  -- Indication: For Tibia fracture    lidocaine 5% topical film  -- Apply on skin to affected area once a day  -- Indication: For Tibia fracture    famotidine 40 mg oral tablet  -- 1 tab(s) by mouth 2 times a day  -- Indication: For GERD    ferrous sulfate 325 mg (65 mg elemental iron) oral tablet  -- 1 tab(s) by mouth 3 times a day  -- Indication: For Iron Deficiency Anemia     Multiple Vitamins oral tablet  -- 1 tab(s) by mouth once a day  -- Indication: For Alcohol abuse    folic acid 1 mg oral tablet  -- 1 tab(s) by mouth once a day  -- Indication: For Alcohol abuse    thiamine 100 mg oral tablet  -- 1 tab(s) by mouth once a day  -- Indication: For Alcohol abuse
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
## 2018-04-17 NOTE — DISCHARGE NOTE ADULT - PATIENT PORTAL LINK FT
You can access the [facility] Patient Portal, offered by , by registering with the following website: [email or web address]

## 2018-04-17 NOTE — DISCHARGE NOTE ADULT - MEDICATION SUMMARY - MEDICATIONS TO STOP TAKING
I will STOP taking the medications listed below when I get home from the hospital:    Levaquin 750 mg oral tablet  -- 1 tab(s) by mouth once a day   -- Avoid prolonged or excessive exposure to direct and/or artificial sunlight while taking this medication.  Do not take dairy products, antacids, or iron preparations within one hour of this medication.  Finish all this medication unless otherwise directed by prescriber.  May cause drowsiness or dizziness.  Medication should be taken with plenty of water.    Keflex 500 mg oral capsule  -- 1 cap(s) by mouth 2 times a day   -- Finish all this medication unless otherwise directed by prescriber.
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
## 2018-04-17 NOTE — PROGRESS NOTE ADULT - PROBLEM SELECTOR PLAN 7
F: No IVF  E: Replete PRN  N: Regular diet  VTE PPX: IMPROVE 1, no chemo ppx indicated    FULL CODE  DISPO: GUSTAVO

## 2018-04-17 NOTE — PHYSICAL THERAPY INITIAL EVALUATION ADULT - CRITERIA FOR SKILLED THERAPEUTIC INTERVENTIONS
anticipated discharge recommendation/functional limitations in following categories/risk reduction/prevention/rehab potential/impairments found/therapy frequency
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
## 2018-04-18 DIAGNOSIS — Z78.9 OTHER SPECIFIED HEALTH STATUS: ICD-10-CM

## 2018-04-18 DIAGNOSIS — R74.0 NONSPECIFIC ELEVATION OF LEVELS OF TRANSAMINASE AND LACTIC ACID DEHYDROGENASE [LDH]: ICD-10-CM

## 2018-04-18 DIAGNOSIS — M25.512 PAIN IN LEFT SHOULDER: ICD-10-CM

## 2018-04-18 DIAGNOSIS — S82.141A DISPLACED BICONDYLAR FRACTURE OF RIGHT TIBIA, INITIAL ENCOUNTER FOR CLOSED FRACTURE: ICD-10-CM

## 2018-04-18 DIAGNOSIS — D64.9 ANEMIA, UNSPECIFIED: ICD-10-CM

## 2018-04-18 LAB
24R-OH-CALCIDIOL SERPL-MCNC: 34.1 NG/ML — SIGNIFICANT CHANGE UP (ref 30–80)
ALBUMIN SERPL ELPH-MCNC: 3.4 G/DL — SIGNIFICANT CHANGE UP (ref 3.3–5)
ALP SERPL-CCNC: 123 U/L — HIGH (ref 40–120)
ALT FLD-CCNC: 14 U/L — SIGNIFICANT CHANGE UP (ref 10–45)
ANION GAP SERPL CALC-SCNC: 13 MMOL/L — SIGNIFICANT CHANGE UP (ref 5–17)
AST SERPL-CCNC: 20 U/L — SIGNIFICANT CHANGE UP (ref 10–40)
BILIRUB SERPL-MCNC: 0.2 MG/DL — SIGNIFICANT CHANGE UP (ref 0.2–1.2)
BLD GP AB SCN SERPL QL: NEGATIVE — SIGNIFICANT CHANGE UP
BUN SERPL-MCNC: 8 MG/DL — SIGNIFICANT CHANGE UP (ref 7–23)
CALCIUM SERPL-MCNC: 9.3 MG/DL — SIGNIFICANT CHANGE UP (ref 8.4–10.5)
CHLORIDE SERPL-SCNC: 99 MMOL/L — SIGNIFICANT CHANGE UP (ref 96–108)
CO2 SERPL-SCNC: 23 MMOL/L — SIGNIFICANT CHANGE UP (ref 22–31)
CREAT SERPL-MCNC: 0.42 MG/DL — LOW (ref 0.5–1.3)
GGT SERPL-CCNC: 24 U/L — SIGNIFICANT CHANGE UP (ref 8–40)
GLUCOSE SERPL-MCNC: 120 MG/DL — HIGH (ref 70–99)
HCT VFR BLD CALC: 27.3 % — LOW (ref 34.5–45)
HGB BLD-MCNC: 8 G/DL — LOW (ref 11.5–15.5)
MAGNESIUM SERPL-MCNC: 1.7 MG/DL — SIGNIFICANT CHANGE UP (ref 1.6–2.6)
MCHC RBC-ENTMCNC: 20.8 PG — LOW (ref 27–34)
MCHC RBC-ENTMCNC: 29.3 G/DL — LOW (ref 32–36)
MCV RBC AUTO: 71.1 FL — LOW (ref 80–100)
PLATELET # BLD AUTO: 229 K/UL — SIGNIFICANT CHANGE UP (ref 150–400)
POTASSIUM SERPL-MCNC: 3.9 MMOL/L — SIGNIFICANT CHANGE UP (ref 3.5–5.3)
POTASSIUM SERPL-SCNC: 3.9 MMOL/L — SIGNIFICANT CHANGE UP (ref 3.5–5.3)
PROT SERPL-MCNC: 7.4 G/DL — SIGNIFICANT CHANGE UP (ref 6–8.3)
RBC # BLD: 3.84 M/UL — SIGNIFICANT CHANGE UP (ref 3.8–5.2)
RBC # FLD: 18.2 % — HIGH (ref 10.3–16.9)
RH IG SCN BLD-IMP: POSITIVE — SIGNIFICANT CHANGE UP
SODIUM SERPL-SCNC: 135 MMOL/L — SIGNIFICANT CHANGE UP (ref 135–145)
WBC # BLD: 4.7 K/UL — SIGNIFICANT CHANGE UP (ref 3.8–10.5)
WBC # FLD AUTO: 4.7 K/UL — SIGNIFICANT CHANGE UP (ref 3.8–10.5)

## 2018-04-18 PROCEDURE — 99232 SBSQ HOSP IP/OBS MODERATE 35: CPT

## 2018-04-18 PROCEDURE — 73060 X-RAY EXAM OF HUMERUS: CPT | Mod: 26,LT

## 2018-04-18 PROCEDURE — 99221 1ST HOSP IP/OBS SF/LOW 40: CPT

## 2018-04-18 RX ORDER — THIAMINE MONONITRATE (VIT B1) 100 MG
1 TABLET ORAL
Qty: 0 | Refills: 0 | COMMUNITY
Start: 2018-04-18

## 2018-04-18 RX ORDER — FOLIC ACID 0.8 MG
1 TABLET ORAL
Qty: 0 | Refills: 0 | COMMUNITY
Start: 2018-04-18

## 2018-04-18 RX ORDER — FAMOTIDINE 10 MG/ML
1 INJECTION INTRAVENOUS
Qty: 0 | Refills: 0 | COMMUNITY
Start: 2018-04-18

## 2018-04-18 RX ORDER — POTASSIUM CHLORIDE 20 MEQ
10 PACKET (EA) ORAL ONCE
Qty: 0 | Refills: 0 | Status: COMPLETED | OUTPATIENT
Start: 2018-04-18 | End: 2018-04-18

## 2018-04-18 RX ORDER — MAGNESIUM SULFATE 500 MG/ML
2 VIAL (ML) INJECTION ONCE
Qty: 0 | Refills: 0 | Status: COMPLETED | OUTPATIENT
Start: 2018-04-18 | End: 2018-04-18

## 2018-04-18 RX ORDER — IBUPROFEN 200 MG
1 TABLET ORAL
Qty: 0 | Refills: 0 | COMMUNITY
Start: 2018-04-18

## 2018-04-18 RX ORDER — FERROUS SULFATE 325(65) MG
1 TABLET ORAL
Qty: 0 | Refills: 0 | COMMUNITY

## 2018-04-18 RX ORDER — LIDOCAINE 4 G/100G
1 CREAM TOPICAL
Qty: 0 | Refills: 0 | COMMUNITY
Start: 2018-04-18

## 2018-04-18 RX ORDER — ACETAMINOPHEN 500 MG
2 TABLET ORAL
Qty: 0 | Refills: 0 | COMMUNITY
Start: 2018-04-18

## 2018-04-18 RX ADMIN — Medication 1 MILLIGRAM(S): at 12:05

## 2018-04-18 RX ADMIN — Medication 600 MILLIGRAM(S): at 12:05

## 2018-04-18 RX ADMIN — Medication 100 MILLIGRAM(S): at 12:04

## 2018-04-18 RX ADMIN — Medication 650 MILLIGRAM(S): at 05:03

## 2018-04-18 RX ADMIN — Medication 10 MILLIEQUIVALENT(S): at 07:49

## 2018-04-18 RX ADMIN — FAMOTIDINE 40 MILLIGRAM(S): 10 INJECTION INTRAVENOUS at 05:03

## 2018-04-18 RX ADMIN — FAMOTIDINE 40 MILLIGRAM(S): 10 INJECTION INTRAVENOUS at 18:16

## 2018-04-18 RX ADMIN — HEPARIN SODIUM 5000 UNIT(S): 5000 INJECTION INTRAVENOUS; SUBCUTANEOUS at 05:03

## 2018-04-18 RX ADMIN — LIDOCAINE 1 PATCH: 4 CREAM TOPICAL at 09:00

## 2018-04-18 RX ADMIN — Medication 650 MILLIGRAM(S): at 19:16

## 2018-04-18 RX ADMIN — HEPARIN SODIUM 5000 UNIT(S): 5000 INJECTION INTRAVENOUS; SUBCUTANEOUS at 18:16

## 2018-04-18 RX ADMIN — Medication 650 MILLIGRAM(S): at 06:03

## 2018-04-18 RX ADMIN — Medication 1 TABLET(S): at 12:05

## 2018-04-18 RX ADMIN — Medication 600 MILLIGRAM(S): at 21:06

## 2018-04-18 RX ADMIN — Medication 650 MILLIGRAM(S): at 18:16

## 2018-04-18 RX ADMIN — LIDOCAINE 1 PATCH: 4 CREAM TOPICAL at 21:06

## 2018-04-18 RX ADMIN — Medication 50 GRAM(S): at 07:48

## 2018-04-18 RX ADMIN — Medication 600 MILLIGRAM(S): at 21:46

## 2018-04-18 RX ADMIN — Medication 600 MILLIGRAM(S): at 13:05
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
## 2018-04-18 NOTE — PROGRESS NOTE ADULT - SUBJECTIVE AND OBJECTIVE BOX
Patient is a 57y old  Female who presents with a chief complaint of Knee Pain (17 Apr 2018 02:08)      INTERVAL HPI/OVERNIGHT EVENTS:    Pt. seen and examined at 2:45PM  c/o L shoulder and R knee pain, controlled w/ rx  No sx of EtOH w/d    Review of Systems: 12 point review of systems otherwise negative    MEDICATIONS  (STANDING):  famotidine    Tablet 40 milliGRAM(s) Oral two times a day  folic acid 1 milliGRAM(s) Oral daily  heparin  Injectable 5000 Unit(s) SubCutaneous every 12 hours  lidocaine   Patch 1 Patch Transdermal daily  multivitamin 1 Tablet(s) Oral daily  thiamine 100 milliGRAM(s) Oral daily    MEDICATIONS  (PRN):  acetaminophen   Tablet. 650 milliGRAM(s) Oral every 6 hours PRN Moderate Pain (4 - 6)  ibuprofen  Tablet 600 milliGRAM(s) Oral every 8 hours PRN Pain  ondansetron Injectable 4 milliGRAM(s) IV Push every 12 hours PRN Nausea and/or Vomiting      Allergies    codeine (Unknown)    Intolerances          Vital Signs Last 24 Hrs  T(C): 37.6 (18 Apr 2018 15:50), Max: 37.6 (18 Apr 2018 15:50)  T(F): 99.7 (18 Apr 2018 15:50), Max: 99.7 (18 Apr 2018 15:50)  HR: 95 (18 Apr 2018 15:50) (90 - 95)  BP: 106/53 (18 Apr 2018 15:50) (106/53 - 130/74)  BP(mean): --  RR: 16 (18 Apr 2018 15:50) (16 - 17)  SpO2: 99% (18 Apr 2018 15:50) (99% - 100%)  CAPILLARY BLOOD GLUCOSE          04-17 @ 07:01  -  04-18 @ 07:00  --------------------------------------------------------  IN: 370 mL / OUT: 450 mL / NET: -80 mL    04-18 @ 07:01  -  04-18 @ 16:22  --------------------------------------------------------  IN: 300 mL / OUT: 600 mL / NET: -300 mL        Physical Exam:  (at 2:45PM)  Daily     Daily   General:  comfortable-appearing in NAD  HEENT:  anicteric  Extremities:  R knee wrapped  Skin:  WWP  Neuro:  AAOx3, no tremor    LABS:                        8.0    4.7   )-----------( 229      ( 18 Apr 2018 06:31 )             27.3     04-18    135  |  99  |  8   ----------------------------<  120<H>  3.9   |  23  |  0.42<L>    Ca    9.3      18 Apr 2018 06:31  Mg     1.7     04-18    TPro  7.4  /  Alb  3.4  /  TBili  0.2  /  DBili  x   /  AST  20  /  ALT  14  /  AlkPhos  123<H>  04-18    PT/INR - ( 17 Apr 2018 07:10 )   PT: 12.3 sec;   INR: 1.11          PTT - ( 17 Apr 2018 07:10 )  PTT:25.2 sec        RADIOLOGY & ADDITIONAL TESTS:    ---------------------------------------------------------------------------  I personally reviewed: [  ]EKG   [  ]CXR    [  ] CT    [  ]Other  ---------------------------------------------------------------------------  PLEASE CHECK WHEN PRESENT:     [  ]Heart Failure     [  ] Acute     [  ] Acute on Chronic     [  ] Chronic  -------------------------------------------------------------------     [  ]Diastolic [HFpEF]     [  ]Systolic [HFrEF]     [  ]Combined [HFpEF & HFrEF]     [  ]Other:  -------------------------------------------------------------------  [  ]JOSH     [  ]ATN     [  ]Reneal Medullary Necrosis     [  ]Renal Cortical Necrosis     [  ]Other Pathological Lesions:    [  ]CKD 1  [  ]CKD 2  [  ]CKD 3  [  ]CKD 4  [  ]CKD 5  [  ]Other  -------------------------------------------------------------------  [  ]Other/Unspecified:    --------------------------------------------------------------------    Abdominal Nutritional Status  [  ]Malnutrition: See Nutrition Note  [  ]Cachexia  [  ]Other:   [  ]Supplement Ordered:  [  ]Morbid Obesity (BMI >=40]
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
## 2018-04-18 NOTE — PROGRESS NOTE ADULT - PROBLEM SELECTOR PLAN 4
hx of ETOH abuse, no abdominal tenderness on palpation; monitor LFTs; will need outpatient imaging studies  - Positive for Hep A   - Pt to followup with PCP  - Supportive management

## 2018-04-18 NOTE — PROGRESS NOTE ADULT - PROBLEM SELECTOR PLAN 5
nonblanching, nontender erythema noticed by patient at the left medial ankle region; cause is unclear, denies trauma, will monitor for worsening sxs.   - coags noell

## 2018-04-19 LAB
ANION GAP SERPL CALC-SCNC: 13 MMOL/L — SIGNIFICANT CHANGE UP (ref 5–17)
BUN SERPL-MCNC: 10 MG/DL — SIGNIFICANT CHANGE UP (ref 7–23)
CALCIUM SERPL-MCNC: 9.6 MG/DL — SIGNIFICANT CHANGE UP (ref 8.4–10.5)
CHLORIDE SERPL-SCNC: 99 MMOL/L — SIGNIFICANT CHANGE UP (ref 96–108)
CO2 SERPL-SCNC: 21 MMOL/L — LOW (ref 22–31)
CREAT SERPL-MCNC: 0.45 MG/DL — LOW (ref 0.5–1.3)
GLUCOSE SERPL-MCNC: 107 MG/DL — HIGH (ref 70–99)
HCT VFR BLD CALC: 28.5 % — LOW (ref 34.5–45)
HGB BLD-MCNC: 8.5 G/DL — LOW (ref 11.5–15.5)
MAGNESIUM SERPL-MCNC: 1.7 MG/DL — SIGNIFICANT CHANGE UP (ref 1.6–2.6)
MCHC RBC-ENTMCNC: 21.1 PG — LOW (ref 27–34)
MCHC RBC-ENTMCNC: 29.8 G/DL — LOW (ref 32–36)
MCV RBC AUTO: 70.9 FL — LOW (ref 80–100)
PLATELET # BLD AUTO: 239 K/UL — SIGNIFICANT CHANGE UP (ref 150–400)
POTASSIUM SERPL-MCNC: 4.2 MMOL/L — SIGNIFICANT CHANGE UP (ref 3.5–5.3)
POTASSIUM SERPL-SCNC: 4.2 MMOL/L — SIGNIFICANT CHANGE UP (ref 3.5–5.3)
RBC # BLD: 4.02 M/UL — SIGNIFICANT CHANGE UP (ref 3.8–5.2)
RBC # FLD: 18.3 % — HIGH (ref 10.3–16.9)
SODIUM SERPL-SCNC: 133 MMOL/L — LOW (ref 135–145)
WBC # BLD: 5.2 K/UL — SIGNIFICANT CHANGE UP (ref 3.8–10.5)
WBC # FLD AUTO: 5.2 K/UL — SIGNIFICANT CHANGE UP (ref 3.8–10.5)

## 2018-04-19 PROCEDURE — 99233 SBSQ HOSP IP/OBS HIGH 50: CPT | Mod: GC

## 2018-04-19 RX ORDER — MAGNESIUM SULFATE 500 MG/ML
2 VIAL (ML) INJECTION ONCE
Qty: 0 | Refills: 0 | Status: COMPLETED | OUTPATIENT
Start: 2018-04-19 | End: 2018-04-19

## 2018-04-19 RX ADMIN — Medication 650 MILLIGRAM(S): at 06:00

## 2018-04-19 RX ADMIN — LIDOCAINE 1 PATCH: 4 CREAM TOPICAL at 10:00

## 2018-04-19 RX ADMIN — Medication 100 MILLIGRAM(S): at 10:57

## 2018-04-19 RX ADMIN — Medication 600 MILLIGRAM(S): at 17:03

## 2018-04-19 RX ADMIN — FAMOTIDINE 40 MILLIGRAM(S): 10 INJECTION INTRAVENOUS at 05:01

## 2018-04-19 RX ADMIN — Medication 650 MILLIGRAM(S): at 05:00

## 2018-04-19 RX ADMIN — Medication 600 MILLIGRAM(S): at 18:03

## 2018-04-19 RX ADMIN — Medication 1 TABLET(S): at 10:57

## 2018-04-19 RX ADMIN — LIDOCAINE 1 PATCH: 4 CREAM TOPICAL at 21:28

## 2018-04-19 RX ADMIN — HEPARIN SODIUM 5000 UNIT(S): 5000 INJECTION INTRAVENOUS; SUBCUTANEOUS at 17:03

## 2018-04-19 RX ADMIN — FAMOTIDINE 40 MILLIGRAM(S): 10 INJECTION INTRAVENOUS at 17:03

## 2018-04-19 RX ADMIN — HEPARIN SODIUM 5000 UNIT(S): 5000 INJECTION INTRAVENOUS; SUBCUTANEOUS at 05:00

## 2018-04-19 RX ADMIN — Medication 1 MILLIGRAM(S): at 10:57

## 2018-04-19 RX ADMIN — Medication 50 GRAM(S): at 10:51

## 2018-04-19 NOTE — OCCUPATIONAL THERAPY INITIAL EVALUATION ADULT - ADDITIONAL COMMENTS
Per patient she required assist for dressing, grooming PTA, ambulating independently. Previously went to [facility] in [place] following left humerus fracture (had [facility] brace/sling).
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
## 2018-04-19 NOTE — DIETITIAN INITIAL EVALUATION ADULT. - NS AS NUTRI INTERV ED CONTENT
Discussed w/ pt varied diet and continuing w/ MVI after d/c if feasible./Purpose of the nutrition education

## 2018-04-19 NOTE — PROGRESS NOTE ADULT - SUBJECTIVE AND OBJECTIVE BOX
** THIS NOTE IS IN PROGRESS    O/N Events:  Subjective:    VITALS  Vital Signs Last 24 Hrs  T(C): 36.4 (19 Apr 2018 09:24), Max: 37.6 (18 Apr 2018 15:50)  T(F): 97.5 (19 Apr 2018 09:24), Max: 99.7 (18 Apr 2018 15:50)  HR: 94 (19 Apr 2018 09:24) (87 - 105)  BP: 110/70 (19 Apr 2018 09:24) (106/53 - 130/81)  BP(mean): --  RR: 16 (19 Apr 2018 09:24) (16 - 16)  SpO2: 98% (19 Apr 2018 09:24) (98% - 99%)    I&O's Summary    18 Apr 2018 07:01  -  19 Apr 2018 07:00  --------------------------------------------------------  IN: 300 mL / OUT: 1150 mL / NET: -850 mL    19 Apr 2018 07:01  -  19 Apr 2018 10:13  --------------------------------------------------------  IN: 360 mL / OUT: 500 mL / NET: -140 mL        CAPILLARY BLOOD GLUCOSE          PHYSICAL EXAM  General: A&Ox 3; NAD  Head: NC/AT;   Eyes: PERRL; EOMI; anicteric sclera  Neck: Supple; no JVD  Respiratory: CTA B/L; no wheezes/crackles/rales auscultated w/ good air movement  Cardiovascular: Regular rhythm/rate; S1/S2; no gallops or murmurs auscultated  Gastrointestinal: Soft; NTND w/out rebound tenderness or guarding; bowel sounds normal  Extremities: WWP; no edema or cyanosis; radial/pedal pulses palpable  Neurological:  CNII-XII grossly intact; no obvious focal deficits    MEDICATIONS  (STANDING):  famotidine    Tablet 40 milliGRAM(s) Oral two times a day  folic acid 1 milliGRAM(s) Oral daily  heparin  Injectable 5000 Unit(s) SubCutaneous every 12 hours  lidocaine   Patch 1 Patch Transdermal daily  magnesium sulfate  IVPB 2 Gram(s) IV Intermittent once  multivitamin 1 Tablet(s) Oral daily  thiamine 100 milliGRAM(s) Oral daily    MEDICATIONS  (PRN):  acetaminophen   Tablet. 650 milliGRAM(s) Oral every 6 hours PRN Moderate Pain (4 - 6)  ibuprofen  Tablet 600 milliGRAM(s) Oral every 8 hours PRN Pain  ondansetron Injectable 4 milliGRAM(s) IV Push every 12 hours PRN Nausea and/or Vomiting      LABS                        8.5    5.2   )-----------( 239      ( 19 Apr 2018 08:29 )             28.5     04-19    133<L>  |  99  |  10  ----------------------------<  107<H>  4.2   |  21<L>  |  0.45<L>    Ca    9.6      19 Apr 2018 08:29  Mg     1.7     04-19    TPro  7.4  /  Alb  3.4  /  TBili  0.2  /  DBili  x   /  AST  20  /  ALT  14  /  AlkPhos  123<H>  04-18    LIVER FUNCTIONS - ( 18 Apr 2018 06:31 )  Alb: 3.4 g/dL / Pro: 7.4 g/dL / ALK PHOS: 123 U/L / ALT: 14 U/L / AST: 20 U/L / GGT: 24 U/L                 IMAGING/EKG/ETC  EKG:   Xray:  CT: O/N Events: DARLENE.  Subjective: Patient seen and examined at bedside. Complains of pain in right arm and left tibia though improved. This AM ambulated well to bathroom with assistance.     REVIEW OF SYSTEMS:    CONSTITUTIONAL: No weakness, fevers or chills  EYES/ENT: No visual changes;  No vertigo or throat pain   NECK: No pain or stiffness  RESPIRATORY: No cough, wheezing, hemoptysis; No shortness of breath  CARDIOVASCULAR: No chest pain or palpitations  GASTROINTESTINAL: No abdominal or epigastric pain. No nausea, vomiting, or hematemesis; No diarrhea or constipation. No melena or hematochezia.  GENITOURINARY: No dysuria, frequency or hematuria  NEUROLOGICAL: No numbness or weakness  SKIN: No itching, rashes      VITALS  Vital Signs Last 24 Hrs  T(C): 36.4 (19 Apr 2018 09:24), Max: 37.6 (18 Apr 2018 15:50)  T(F): 97.5 (19 Apr 2018 09:24), Max: 99.7 (18 Apr 2018 15:50)  HR: 94 (19 Apr 2018 09:24) (87 - 105)  BP: 110/70 (19 Apr 2018 09:24) (106/53 - 130/81)  BP(mean): --  RR: 16 (19 Apr 2018 09:24) (16 - 16)  SpO2: 98% (19 Apr 2018 09:24) (98% - 99%)    I&O's Summary    18 Apr 2018 07:01  -  19 Apr 2018 07:00  --------------------------------------------------------  IN: 300 mL / OUT: 1150 mL / NET: -850 mL    19 Apr 2018 07:01  -  19 Apr 2018 10:13  --------------------------------------------------------  IN: 360 mL / OUT: 500 mL / NET: -140 mL        CAPILLARY BLOOD GLUCOSE  PHYSICAL EXAM  General: A&Ox 3; NAD  Head: NC/AT;   Eyes: PERRL; EOMI; anicteric sclera  Neck: Supple; no JVD  Respiratory: CTA B/L; no wheezes/crackles/rales auscultated w/ good air movement  Cardiovascular: Regular rhythm/rate; S1/S2; no gallops or murmurs auscultated  Gastrointestinal: Soft; NTND w/out rebound tenderness or guarding; bowel sounds normal  Extremities: WWP; no edema or cyanosis; radial/pedal pulses palpable. Brace in place on RLE above knee to ankle. Sensation intact over foot and ankle, dorsalis pedis pulse 2+.   Neurological:  CNII-XII grossly intact; no obvious focal deficits. No tremor or tongue fasiculations. CIWA 0.      MEDICATIONS  (STANDING):  famotidine    Tablet 40 milliGRAM(s) Oral two times a day  folic acid 1 milliGRAM(s) Oral daily  heparin  Injectable 5000 Unit(s) SubCutaneous every 12 hours  lidocaine   Patch 1 Patch Transdermal daily  magnesium sulfate  IVPB 2 Gram(s) IV Intermittent once  multivitamin 1 Tablet(s) Oral daily  thiamine 100 milliGRAM(s) Oral daily    MEDICATIONS  (PRN):  acetaminophen   Tablet. 650 milliGRAM(s) Oral every 6 hours PRN Moderate Pain (4 - 6)  ibuprofen  Tablet 600 milliGRAM(s) Oral every 8 hours PRN Pain  ondansetron Injectable 4 milliGRAM(s) IV Push every 12 hours PRN Nausea and/or Vomiting      LABS                        8.5    5.2   )-----------( 239      ( 19 Apr 2018 08:29 )             28.5     04-19    133<L>  |  99  |  10  ----------------------------<  107<H>  4.2   |  21<L>  |  0.45<L>    Ca    9.6      19 Apr 2018 08:29  Mg     1.7     04-19    TPro  7.4  /  Alb  3.4  /  TBili  0.2  /  DBili  x   /  AST  20  /  ALT  14  /  AlkPhos  123<H>  04-18    LIVER FUNCTIONS - ( 18 Apr 2018 06:31 )  Alb: 3.4 g/dL / Pro: 7.4 g/dL / ALK PHOS: 123 U/L / ALT: 14 U/L / AST: 20 U/L / GGT: 24 U/L                 IMAGING/EKG/ETC: reviewed
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
## 2018-04-19 NOTE — PROGRESS NOTE ADULT - ATTENDING COMMENTS
Dispo: YUSUF ren
Patient was seen and examined by me at bedside. I agree with resident's note, subjective, objective physical exam, assessment and plan with following modifications/additions.     Patient needs YUSUF placement
Patient was seen and examined by me at bedside. I agree with resident's note, subjective, objective physical exam, assessment and plan with following modifications/additions.     Hyponatremia- asymptomatic avoid excess IV fluids continue to monitor

## 2018-04-19 NOTE — OCCUPATIONAL THERAPY INITIAL EVALUATION ADULT - PLANNED THERAPY INTERVENTIONS, OT EVAL
ROM/IADL retraining/bed mobility training/strengthening/ADL retraining/balance training/transfer training

## 2018-04-19 NOTE — DIETITIAN INITIAL EVALUATION ADULT. - PROBLEM SELECTOR PLAN 4
hx of ETOH abuse, no abdominal tenderness on palpation; will check hepatitis studies; monitor LFTs; will need outpatient imaging studies

## 2018-04-19 NOTE — DIETITIAN INITIAL EVALUATION ADULT. - PROBLEM SELECTOR PLAN 5
nonblanching, nontender erythema noticed by patient at the left medial ankle region; cause is unclear, denies trauma, will monitor for worsening sxs. check coags.

## 2018-04-19 NOTE — DIETITIAN INITIAL EVALUATION ADULT. - PROBLEM SELECTOR PLAN 3
Microcytic anemia; Iron studies from Jan 2018 suggestive of ACD, likely in setting of EtOH abuse. Hgb 8.1 on admission with baseline hgb 8-9  - continue to monitor

## 2018-04-19 NOTE — OCCUPATIONAL THERAPY INITIAL EVALUATION ADULT - RANGE OF MOTION EXAMINATION, UPPER EXTREMITY
Left active shoulder flexion to approximately 100 degrees. Left elbow AROM WFL (+pain), left wrist AROM WFL, left hand no flexion digit 4 (reports history of "plate")./Right UE Active ROM was WFL (within functional limits)

## 2018-04-19 NOTE — DIETITIAN INITIAL EVALUATION ADULT. - PROBLEM SELECTOR PLAN 2
Pt reported 6 beers a day, last drink evening prior to admission. Hx of tremors, nausea, sweating, nausea, anxiousness days without EtOH. CIWA currently 1 for nausea  -CIWA q4hr  -c/w zofran 4mg IVP q12hr for nausea  -ativan for CIWA>8  -c/w folic acid, thiamine, MV

## 2018-04-19 NOTE — OCCUPATIONAL THERAPY INITIAL EVALUATION ADULT - PERTINENT HX OF CURRENT PROBLEM, REHAB EVAL
Patient found to have depressed fracture of the lateral tibial plateau and avulsion of the medial tibial spine on CT, old left midshaft humerus fx.

## 2018-04-19 NOTE — OCCUPATIONAL THERAPY INITIAL EVALUATION ADULT - GENERAL OBSERVATIONS, REHAB EVAL
Right hand dominant. Chart reviewed, patient cleared for OT eval by TIEN Dodge. Received semi-supine, NAD, +heplock, +right knee immobilizer.

## 2018-04-19 NOTE — DIETITIAN INITIAL EVALUATION ADULT. - ENERGY NEEDS
Height: 4'11" Weight: 95lbs, IBW 98lbs+/-10%, %IBW 97%, BMI 19  ABW used for calculations as pt between % of IBW.   Nutrient needs based on [facility] standards of care for maintenance in adults.

## 2018-04-20 VITALS
TEMPERATURE: 98 F | DIASTOLIC BLOOD PRESSURE: 65 MMHG | SYSTOLIC BLOOD PRESSURE: 104 MMHG | HEART RATE: 90 BPM | OXYGEN SATURATION: 99 % | RESPIRATION RATE: 16 BRPM

## 2018-04-20 DIAGNOSIS — E87.1 HYPO-OSMOLALITY AND HYPONATREMIA: ICD-10-CM

## 2018-04-20 LAB
ANION GAP SERPL CALC-SCNC: 14 MMOL/L — SIGNIFICANT CHANGE UP (ref 5–17)
BUN SERPL-MCNC: 13 MG/DL — SIGNIFICANT CHANGE UP (ref 7–23)
CALCIUM SERPL-MCNC: 9.5 MG/DL — SIGNIFICANT CHANGE UP (ref 8.4–10.5)
CHLORIDE SERPL-SCNC: 99 MMOL/L — SIGNIFICANT CHANGE UP (ref 96–108)
CO2 SERPL-SCNC: 21 MMOL/L — LOW (ref 22–31)
CREAT SERPL-MCNC: 0.44 MG/DL — LOW (ref 0.5–1.3)
GLUCOSE SERPL-MCNC: 105 MG/DL — HIGH (ref 70–99)
HCT VFR BLD CALC: 29.2 % — LOW (ref 34.5–45)
HGB BLD-MCNC: 8.7 G/DL — LOW (ref 11.5–15.5)
MAGNESIUM SERPL-MCNC: 1.7 MG/DL — SIGNIFICANT CHANGE UP (ref 1.6–2.6)
MCHC RBC-ENTMCNC: 21.1 PG — LOW (ref 27–34)
MCHC RBC-ENTMCNC: 29.8 G/DL — LOW (ref 32–36)
MCV RBC AUTO: 70.7 FL — LOW (ref 80–100)
PLATELET # BLD AUTO: 279 K/UL — SIGNIFICANT CHANGE UP (ref 150–400)
POTASSIUM SERPL-MCNC: 4.2 MMOL/L — SIGNIFICANT CHANGE UP (ref 3.5–5.3)
POTASSIUM SERPL-SCNC: 4.2 MMOL/L — SIGNIFICANT CHANGE UP (ref 3.5–5.3)
RBC # BLD: 4.13 M/UL — SIGNIFICANT CHANGE UP (ref 3.8–5.2)
RBC # FLD: 18.4 % — HIGH (ref 10.3–16.9)
SODIUM SERPL-SCNC: 134 MMOL/L — LOW (ref 135–145)
WBC # BLD: 5.4 K/UL — SIGNIFICANT CHANGE UP (ref 3.8–10.5)
WBC # FLD AUTO: 5.4 K/UL — SIGNIFICANT CHANGE UP (ref 3.8–10.5)

## 2018-04-20 PROCEDURE — 99239 HOSP IP/OBS DSCHRG MGMT >30: CPT

## 2018-04-20 RX ORDER — MAGNESIUM SULFATE 500 MG/ML
2 VIAL (ML) INJECTION DAILY
Qty: 0 | Refills: 0 | Status: DISCONTINUED | OUTPATIENT
Start: 2018-04-20 | End: 2018-04-20

## 2018-04-20 RX ADMIN — Medication 1 MILLIGRAM(S): at 10:16

## 2018-04-20 RX ADMIN — Medication 600 MILLIGRAM(S): at 11:15

## 2018-04-20 RX ADMIN — LIDOCAINE 1 PATCH: 4 CREAM TOPICAL at 10:00

## 2018-04-20 RX ADMIN — FAMOTIDINE 40 MILLIGRAM(S): 10 INJECTION INTRAVENOUS at 05:34

## 2018-04-20 RX ADMIN — Medication 50 GRAM(S): at 10:15

## 2018-04-20 RX ADMIN — Medication 600 MILLIGRAM(S): at 10:16

## 2018-04-20 RX ADMIN — Medication 1 TABLET(S): at 10:16

## 2018-04-20 RX ADMIN — Medication 100 MILLIGRAM(S): at 10:16

## 2018-04-20 RX ADMIN — HEPARIN SODIUM 5000 UNIT(S): 5000 INJECTION INTRAVENOUS; SUBCUTANEOUS at 05:34

## 2018-04-20 NOTE — PROGRESS NOTE ADULT - PROBLEM SELECTOR PLAN 3
monitor for signs/sx of withdrawal
Microcytic anemia; Iron studies from Jan 2018 suggestive of ACD, likely in setting of EtOH abuse. Hgb 8.1 on admission with baseline hgb 8-9  - continue to monitor

## 2018-04-20 NOTE — PROGRESS NOTE ADULT - PROBLEM SELECTOR PROBLEM 1
Tibia fracture
Closed fracture of right tibial plateau, initial encounter
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
## 2018-04-20 NOTE — PROGRESS NOTE ADULT - PROBLEM SELECTOR PLAN 2
Pt reported 6 beers a day, last drink evening prior to admission. Hx of tremors, nausea, sweating, nausea, anxiousness days without EtOH. CIWA currently 0   - now out of the window for alcohol withdrawal.   -c/w zofran 4mg IVP q12hr for nausea  -c/w folic acid, thiamine, MV
Pt reported 6 beers a day, last drink evening prior to admission. Hx of tremors, nausea, sweating, nausea, anxiousness days without EtOH. CIWA currently 0   -CIWA q4hr  -c/w zofran 4mg IVP q12hr for nausea  -ativan for CIWA>8  -c/w folic acid, thiamine, MV
Pt reported 6 beers a day, last drink evening prior to admission. Hx of tremors, nausea, sweating, nausea, anxiousness days without EtOH. CIWA currently 0   -CIWA q4hr  -c/w zofran 4mg IVP q12hr for nausea  -ativan for CIWA>8  -c/w folic acid, thiamine, MV
Pt reported 6 beers a day, last drink evening prior to admission. Hx of tremors, nausea, sweating, nausea, anxiousness days without EtOH. CIWA currently 0 for nausea  -CIWA q4hr  -c/w zofran 4mg IVP q12hr for nausea  -ativan for CIWA>8  -c/w folic acid, thiamine, MV
per Pt., she was dx w/ fracture at OSH; will check x-ray, try to obtain collateral info, f/u Ortho recs, pain control as above, may need sling

## 2018-04-20 NOTE — PROGRESS NOTE ADULT - SUBJECTIVE AND OBJECTIVE BOX
O/N Events: DARLENE  Subjective: Patient seen and examined at bedside. Complains of left arm pain unchanged from admission, improves with tylenol. Reports she is able to ambulate well with assistance and walker.     VITALS  Vital Signs Last 24 Hrs  T(C): 37 (20 Apr 2018 08:52), Max: 37 (20 Apr 2018 08:52)  T(F): 98.6 (20 Apr 2018 08:52), Max: 98.6 (20 Apr 2018 08:52)  HR: 100 (20 Apr 2018 08:52) (91 - 124)  BP: 101/69 (20 Apr 2018 08:52) (101/69 - 130/84)  BP(mean): --  RR: 16 (20 Apr 2018 08:52) (16 - 16)  SpO2: 99% (20 Apr 2018 08:52) (99% - 99%)    I&O's Summary    19 Apr 2018 07:01  -  20 Apr 2018 07:00  --------------------------------------------------------  IN: 1320 mL / OUT: 2950 mL / NET: -1630 mL    20 Apr 2018 07:01  -  20 Apr 2018 10:40  --------------------------------------------------------  IN: 300 mL / OUT: 300 mL / NET: 0 mL        CAPILLARY BLOOD GLUCOSE          PHYSICAL EXAM  General: A&Ox 3; NAD  Head: NC/AT;   Eyes: PERRL; EOMI; anicteric sclera  Neck: Supple; no JVD  Respiratory: CTA B/L; no wheezes/crackles/rales auscultated w/ good air movement  Cardiovascular: Regular rhythm/rate; S1/S2; no gallops or murmurs auscultated  Gastrointestinal: Soft; NTND w/out rebound tenderness or guarding; bowel sounds normal  Extremities: WWP; no edema or cyanosis; radial/pedal pulses palpable. RLE brace in place, sensation intact throughout RLE.   Neurological:  CNII-XII grossly intact; no obvious focal deficits    MEDICATIONS  (STANDING):  famotidine    Tablet 40 milliGRAM(s) Oral two times a day  folic acid 1 milliGRAM(s) Oral daily  heparin  Injectable 5000 Unit(s) SubCutaneous every 12 hours  lidocaine   Patch 1 Patch Transdermal daily  magnesium sulfate  IVPB 2 Gram(s) IV Intermittent daily  multivitamin 1 Tablet(s) Oral daily  thiamine 100 milliGRAM(s) Oral daily    MEDICATIONS  (PRN):  acetaminophen   Tablet. 650 milliGRAM(s) Oral every 6 hours PRN Moderate Pain (4 - 6)  ibuprofen  Tablet 600 milliGRAM(s) Oral every 8 hours PRN Pain  ondansetron Injectable 4 milliGRAM(s) IV Push every 12 hours PRN Nausea and/or Vomiting      LABS                        8.7    5.4   )-----------( 279      ( 20 Apr 2018 07:10 )             29.2     04-20    134<L>  |  99  |  13  ----------------------------<  105<H>  4.2   |  21<L>  |  0.44<L>    Ca    9.5      20 Apr 2018 07:10  Mg     1.7     04-20                  IMAGING/EKG/ETC: No new imaging

## 2018-04-20 NOTE — PROGRESS NOTE ADULT - PROBLEM SELECTOR PLAN 8
F: No IVF  E: Replete PRN  N: Regular diet  VTE PPX: IMPROVE 1, no chemo ppx indicated    FULL CODE  DISPO: RMF pending YUSUF

## 2018-04-24 DIAGNOSIS — M80.061A: ICD-10-CM

## 2018-04-24 DIAGNOSIS — D64.9 ANEMIA, UNSPECIFIED: ICD-10-CM
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
## 2018-04-24 DIAGNOSIS — S83.511A SPRAIN OF ANTERIOR CRUCIATE LIGAMENT OF RIGHT KNEE, INITIAL ENCOUNTER: ICD-10-CM
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
## 2018-04-24 DIAGNOSIS — S82.121A DISPLACED FRACTURE OF LATERAL CONDYLE OF RIGHT TIBIA, INITIAL ENCOUNTER FOR CLOSED FRACTURE: ICD-10-CM

## 2018-04-24 DIAGNOSIS — F10.10 ALCOHOL ABUSE, UNCOMPLICATED: ICD-10-CM
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
## 2018-05-03 ENCOUNTER — APPOINTMENT (OUTPATIENT)
Age: 58
End: 2018-05-03
Payer: MEDICAID

## 2018-05-03 ENCOUNTER — OUTPATIENT (OUTPATIENT)
Dept: OUTPATIENT SERVICES | Facility: HOSPITAL | Age: 58
LOS: 1 days | End: 2018-05-03
Payer: COMMERCIAL

## 2018-05-03 VITALS — BODY MASS INDEX: 19.35 KG/M2 | WEIGHT: 96 LBS | HEIGHT: 59 IN

## 2018-05-03 DIAGNOSIS — Z87.09 PERSONAL HISTORY OF OTHER DISEASES OF THE RESPIRATORY SYSTEM: ICD-10-CM

## 2018-05-03 PROCEDURE — 73030 X-RAY EXAM OF SHOULDER: CPT | Mod: 26,LT

## 2018-05-03 PROCEDURE — 73030 X-RAY EXAM OF SHOULDER: CPT

## 2018-05-03 PROCEDURE — 73560 X-RAY EXAM OF KNEE 1 OR 2: CPT

## 2018-05-03 PROCEDURE — 99203 OFFICE O/P NEW LOW 30 MIN: CPT

## 2018-05-03 PROCEDURE — 73560 X-RAY EXAM OF KNEE 1 OR 2: CPT | Mod: 26,LT,RT

## 2018-05-17 ENCOUNTER — OUTPATIENT (OUTPATIENT)
Dept: OUTPATIENT SERVICES | Facility: HOSPITAL | Age: 58
LOS: 1 days | End: 2018-05-17
Payer: COMMERCIAL

## 2018-05-17 ENCOUNTER — APPOINTMENT (OUTPATIENT)
Dept: ORTHOPEDIC SURGERY | Facility: CLINIC | Age: 58
End: 2018-05-17
Payer: MEDICAID

## 2018-05-17 DIAGNOSIS — S82.141D DISPLACED BICONDYLAR FRACTURE OF RIGHT TIBIA, SUBSEQUENT ENCOUNTER FOR CLOSED FRACTURE WITH ROUTINE HEALING: ICD-10-CM

## 2018-05-17 PROCEDURE — 99213 OFFICE O/P EST LOW 20 MIN: CPT

## 2018-05-17 PROCEDURE — 73560 X-RAY EXAM OF KNEE 1 OR 2: CPT | Mod: 26,RT

## 2018-05-17 PROCEDURE — 73560 X-RAY EXAM OF KNEE 1 OR 2: CPT

## 2018-05-23 PROCEDURE — 93005 ELECTROCARDIOGRAM TRACING: CPT

## 2018-05-23 PROCEDURE — 82977 ASSAY OF GGT: CPT

## 2018-05-23 PROCEDURE — 86850 RBC ANTIBODY SCREEN: CPT

## 2018-05-23 PROCEDURE — 86706 HEP B SURFACE ANTIBODY: CPT

## 2018-05-23 PROCEDURE — 80048 BASIC METABOLIC PNL TOTAL CA: CPT

## 2018-05-23 PROCEDURE — 86901 BLOOD TYPING SEROLOGIC RH(D): CPT

## 2018-05-23 PROCEDURE — 86803 HEPATITIS C AB TEST: CPT

## 2018-05-23 PROCEDURE — 80053 COMPREHEN METABOLIC PANEL: CPT

## 2018-05-23 PROCEDURE — 73700 CT LOWER EXTREMITY W/O DYE: CPT

## 2018-05-23 PROCEDURE — 86705 HEP B CORE ANTIBODY IGM: CPT

## 2018-05-23 PROCEDURE — 85027 COMPLETE CBC AUTOMATED: CPT

## 2018-05-23 PROCEDURE — 99285 EMERGENCY DEPT VISIT HI MDM: CPT | Mod: 25

## 2018-05-23 PROCEDURE — 83550 IRON BINDING TEST: CPT

## 2018-05-23 PROCEDURE — 96374 THER/PROPH/DIAG INJ IV PUSH: CPT

## 2018-05-23 PROCEDURE — 85025 COMPLETE CBC W/AUTO DIFF WBC: CPT

## 2018-05-23 PROCEDURE — 73060 X-RAY EXAM OF HUMERUS: CPT

## 2018-05-23 PROCEDURE — 86708 HEPATITIS A ANTIBODY: CPT

## 2018-05-23 PROCEDURE — 97161 PT EVAL LOW COMPLEX 20 MIN: CPT

## 2018-05-23 PROCEDURE — 83735 ASSAY OF MAGNESIUM: CPT

## 2018-05-23 PROCEDURE — 97116 GAIT TRAINING THERAPY: CPT

## 2018-05-23 PROCEDURE — 73564 X-RAY EXAM KNEE 4 OR MORE: CPT

## 2018-05-23 PROCEDURE — 36415 COLL VENOUS BLD VENIPUNCTURE: CPT

## 2018-05-23 PROCEDURE — 84466 ASSAY OF TRANSFERRIN: CPT

## 2018-05-23 PROCEDURE — 85045 AUTOMATED RETICULOCYTE COUNT: CPT

## 2018-05-23 PROCEDURE — 87340 HEPATITIS B SURFACE AG IA: CPT

## 2018-05-23 PROCEDURE — 85730 THROMBOPLASTIN TIME PARTIAL: CPT

## 2018-05-23 PROCEDURE — 86900 BLOOD TYPING SEROLOGIC ABO: CPT

## 2018-05-23 PROCEDURE — 85610 PROTHROMBIN TIME: CPT

## 2018-05-23 PROCEDURE — 82306 VITAMIN D 25 HYDROXY: CPT

## 2018-05-23 PROCEDURE — 96375 TX/PRO/DX INJ NEW DRUG ADDON: CPT

## 2018-05-23 PROCEDURE — 82728 ASSAY OF FERRITIN: CPT

## 2018-05-23 PROCEDURE — 96376 TX/PRO/DX INJ SAME DRUG ADON: CPT

## 2018-06-15 NOTE — OCCUPATIONAL THERAPY INITIAL EVALUATION ADULT - VISUAL ASSESSMENT: VISUAL FIELD CUTS
Arterial    Diagnosis: Scheuemann's kyphosis  Doctor requesting consult: Chandrakant    Patient location during procedure: done in OR  Procedure start time: 6/15/2018 8:00 AM  Timeout: 6/15/2018 8:00 AM  Procedure end time: 6/15/2018 8:07 AM  Staffing  Anesthesiologist: BENITEZ MÉNDEZ  Performed: anesthesiologist   Anesthesiologist was present at the time of the procedure.  Preanesthetic Checklist  Completed: patient identified, site marked, surgical consent, pre-op evaluation, timeout performed, IV checked, risks and benefits discussed, monitors and equipment checked and anesthesia consent givenArterial  Skin Prep: chlorhexidine gluconate  Local Infiltration: none  Orientation: right  Location: radial  Catheter Size: 20 G  Catheter placement by Ultrasound guidance. Heme positive aspiration all ports.Insertion Attempts: 3  Assessment  Dressing: secured with tape and tegaderm  Patient: Tolerated well               denies changes

## 2018-10-15 ENCOUNTER — EMERGENCY (EMERGENCY)
Facility: HOSPITAL | Age: 58
LOS: 1 days | Discharge: HOME | End: 2018-10-15
Attending: EMERGENCY MEDICINE | Admitting: EMERGENCY MEDICINE
Payer: COMMERCIAL

## 2018-10-15 VITALS
TEMPERATURE: 98 F | SYSTOLIC BLOOD PRESSURE: 127 MMHG | OXYGEN SATURATION: 93 % | RESPIRATION RATE: 20 BRPM | HEART RATE: 108 BPM | DIASTOLIC BLOOD PRESSURE: 72 MMHG

## 2018-10-15 VITALS
TEMPERATURE: 98 F | RESPIRATION RATE: 22 BRPM | SYSTOLIC BLOOD PRESSURE: 105 MMHG | DIASTOLIC BLOOD PRESSURE: 68 MMHG | OXYGEN SATURATION: 97 % | HEART RATE: 114 BPM

## 2018-10-15 VITALS
TEMPERATURE: 98 F | OXYGEN SATURATION: 98 % | SYSTOLIC BLOOD PRESSURE: 127 MMHG | DIASTOLIC BLOOD PRESSURE: 95 MMHG | WEIGHT: 81.57 LBS | RESPIRATION RATE: 20 BRPM | HEART RATE: 110 BPM | HEIGHT: 59 IN

## 2018-10-15 DIAGNOSIS — R07.81 PLEURODYNIA: ICD-10-CM

## 2018-10-15 DIAGNOSIS — Z79.899 OTHER LONG TERM (CURRENT) DRUG THERAPY: ICD-10-CM

## 2018-10-15 DIAGNOSIS — J45.909 UNSPECIFIED ASTHMA, UNCOMPLICATED: ICD-10-CM

## 2018-10-15 DIAGNOSIS — F10.129 ALCOHOL ABUSE WITH INTOXICATION, UNSPECIFIED: ICD-10-CM

## 2018-10-15 DIAGNOSIS — Z79.1 LONG TERM (CURRENT) USE OF NON-STEROIDAL ANTI-INFLAMMATORIES (NSAID): ICD-10-CM

## 2018-10-15 DIAGNOSIS — Z88.5 ALLERGY STATUS TO NARCOTIC AGENT: ICD-10-CM

## 2018-10-15 DIAGNOSIS — F17.200 NICOTINE DEPENDENCE, UNSPECIFIED, UNCOMPLICATED: ICD-10-CM
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
## 2018-10-15 DIAGNOSIS — F10.10 ALCOHOL ABUSE, UNCOMPLICATED: ICD-10-CM
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
## 2018-10-15 DIAGNOSIS — S20.211A CONTUSION OF RIGHT FRONT WALL OF THORAX, INITIAL ENCOUNTER: ICD-10-CM
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
## 2018-10-15 LAB
ALBUMIN SERPL ELPH-MCNC: 3 G/DL — LOW (ref 3.3–5)
ALBUMIN SERPL ELPH-MCNC: 4.1 G/DL — SIGNIFICANT CHANGE UP (ref 3.3–5)
ALP SERPL-CCNC: 78 U/L — SIGNIFICANT CHANGE UP (ref 40–120)
ALP SERPL-CCNC: SIGNIFICANT CHANGE UP U/L (ref 40–120)
ALT FLD-CCNC: 65 U/L — HIGH (ref 10–45)
ALT FLD-CCNC: SIGNIFICANT CHANGE UP U/L (ref 10–45)
ANION GAP SERPL CALC-SCNC: 17 MMOL/L — SIGNIFICANT CHANGE UP (ref 5–17)
ANION GAP SERPL CALC-SCNC: 21 MMOL/L — HIGH (ref 5–17)
AST SERPL-CCNC: 158 U/L — HIGH (ref 10–40)
AST SERPL-CCNC: SIGNIFICANT CHANGE UP U/L (ref 10–40)
B-OH-BUTYR SERPL-SCNC: 0.7 MMOL/L — HIGH
BASE EXCESS BLDV CALC-SCNC: -1.2 MMOL/L — SIGNIFICANT CHANGE UP
BASOPHILS NFR BLD AUTO: 0.5 % — SIGNIFICANT CHANGE UP (ref 0–2)
BILIRUB SERPL-MCNC: 0.3 MG/DL — SIGNIFICANT CHANGE UP (ref 0.2–1.2)
BILIRUB SERPL-MCNC: 0.4 MG/DL — SIGNIFICANT CHANGE UP (ref 0.2–1.2)
BUN SERPL-MCNC: 2 MG/DL — LOW (ref 7–23)
BUN SERPL-MCNC: 2 MG/DL — LOW (ref 7–23)
CA-I SERPL-SCNC: 1 MMOL/L — LOW (ref 1.12–1.3)
CALCIUM SERPL-MCNC: 6.8 MG/DL — LOW (ref 8.4–10.5)
CALCIUM SERPL-MCNC: 8.7 MG/DL — SIGNIFICANT CHANGE UP (ref 8.4–10.5)
CHLORIDE SERPL-SCNC: 106 MMOL/L — SIGNIFICANT CHANGE UP (ref 96–108)
CHLORIDE SERPL-SCNC: 97 MMOL/L — SIGNIFICANT CHANGE UP (ref 96–108)
CO2 SERPL-SCNC: 18 MMOL/L — LOW (ref 22–31)
CO2 SERPL-SCNC: 20 MMOL/L — LOW (ref 22–31)
CREAT SERPL-MCNC: 0.38 MG/DL — LOW (ref 0.5–1.3)
CREAT SERPL-MCNC: 0.45 MG/DL — LOW (ref 0.5–1.3)
EOSINOPHIL NFR BLD AUTO: 0.2 % — SIGNIFICANT CHANGE UP (ref 0–6)
ETHANOL SERPL-MCNC: 234 MG/DL — HIGH (ref 0–10)
EXTRA BLUE TOP TUBE: SIGNIFICANT CHANGE UP
FOLATE SERPL-MCNC: >20 NG/ML — SIGNIFICANT CHANGE UP
GAS PNL BLDV: 141 MMOL/L — SIGNIFICANT CHANGE UP (ref 138–146)
GAS PNL BLDV: SIGNIFICANT CHANGE UP
GAS PNL BLDV: SIGNIFICANT CHANGE UP
GLUCOSE SERPL-MCNC: 104 MG/DL — HIGH (ref 70–99)
GLUCOSE SERPL-MCNC: 80 MG/DL — SIGNIFICANT CHANGE UP (ref 70–99)
HCO3 BLDV-SCNC: 23 MMOL/L — SIGNIFICANT CHANGE UP (ref 20–27)
HCT VFR BLD CALC: 35.3 % — SIGNIFICANT CHANGE UP (ref 34.5–45)
HGB BLD-MCNC: 11.8 G/DL — SIGNIFICANT CHANGE UP (ref 11.5–15.5)
LYMPHOCYTES # BLD AUTO: 32.2 % — SIGNIFICANT CHANGE UP (ref 13–44)
MCHC RBC-ENTMCNC: 28.5 PG — SIGNIFICANT CHANGE UP (ref 27–34)
MCHC RBC-ENTMCNC: 33.4 G/DL — SIGNIFICANT CHANGE UP (ref 32–36)
MCV RBC AUTO: 85.3 FL — SIGNIFICANT CHANGE UP (ref 80–100)
MONOCYTES NFR BLD AUTO: 6.5 % — SIGNIFICANT CHANGE UP (ref 2–14)
NEUTROPHILS NFR BLD AUTO: 60.6 % — SIGNIFICANT CHANGE UP (ref 43–77)
PCO2 BLDV: 37 MMHG — LOW (ref 41–51)
PH BLDV: 7.41 — SIGNIFICANT CHANGE UP (ref 7.32–7.43)
PLATELET # BLD AUTO: 105 K/UL — LOW (ref 150–400)
PO2 BLDV: 41 MMHG — SIGNIFICANT CHANGE UP
POTASSIUM BLDV-SCNC: 4.2 MMOL/L — SIGNIFICANT CHANGE UP (ref 3.5–4.9)
POTASSIUM SERPL-MCNC: 3.4 MMOL/L — LOW (ref 3.5–5.3)
POTASSIUM SERPL-MCNC: SIGNIFICANT CHANGE UP MMOL/L (ref 3.5–5.3)
POTASSIUM SERPL-SCNC: 3.4 MMOL/L — LOW (ref 3.5–5.3)
POTASSIUM SERPL-SCNC: SIGNIFICANT CHANGE UP MMOL/L (ref 3.5–5.3)
PROT SERPL-MCNC: 6 G/DL — SIGNIFICANT CHANGE UP (ref 6–8.3)
PROT SERPL-MCNC: 8.4 G/DL — HIGH (ref 6–8.3)
RBC # BLD: 4.14 M/UL — SIGNIFICANT CHANGE UP (ref 3.8–5.2)
RBC # FLD: 16.7 % — SIGNIFICANT CHANGE UP (ref 10.3–16.9)
SAO2 % BLDV: 69 % — SIGNIFICANT CHANGE UP
SODIUM SERPL-SCNC: 138 MMOL/L — SIGNIFICANT CHANGE UP (ref 135–145)
SODIUM SERPL-SCNC: 141 MMOL/L — SIGNIFICANT CHANGE UP (ref 135–145)
VIT B12 SERPL-MCNC: >2000 PG/ML — HIGH (ref 232–1245)
WBC # BLD: 4.1 K/UL — SIGNIFICANT CHANGE UP (ref 3.8–10.5)
WBC # FLD AUTO: 4.1 K/UL — SIGNIFICANT CHANGE UP (ref 3.8–10.5)

## 2018-10-15 PROCEDURE — 82746 ASSAY OF FOLIC ACID SERUM: CPT

## 2018-10-15 PROCEDURE — 82330 ASSAY OF CALCIUM: CPT

## 2018-10-15 PROCEDURE — 71045 X-RAY EXAM CHEST 1 VIEW: CPT | Mod: 26

## 2018-10-15 PROCEDURE — 96374 THER/PROPH/DIAG INJ IV PUSH: CPT

## 2018-10-15 PROCEDURE — 96375 TX/PRO/DX INJ NEW DRUG ADDON: CPT

## 2018-10-15 PROCEDURE — 99283 EMERGENCY DEPT VISIT LOW MDM: CPT | Mod: 25

## 2018-10-15 PROCEDURE — 83735 ASSAY OF MAGNESIUM: CPT

## 2018-10-15 PROCEDURE — 82010 KETONE BODYS QUAN: CPT

## 2018-10-15 PROCEDURE — 80307 DRUG TEST PRSMV CHEM ANLYZR: CPT

## 2018-10-15 PROCEDURE — 99285 EMERGENCY DEPT VISIT HI MDM: CPT | Mod: 25

## 2018-10-15 PROCEDURE — 82607 VITAMIN B-12: CPT

## 2018-10-15 PROCEDURE — 80053 COMPREHEN METABOLIC PANEL: CPT

## 2018-10-15 PROCEDURE — 82803 BLOOD GASES ANY COMBINATION: CPT

## 2018-10-15 PROCEDURE — 84132 ASSAY OF SERUM POTASSIUM: CPT

## 2018-10-15 PROCEDURE — 99283 EMERGENCY DEPT VISIT LOW MDM: CPT

## 2018-10-15 PROCEDURE — 85025 COMPLETE CBC W/AUTO DIFF WBC: CPT

## 2018-10-15 PROCEDURE — 36415 COLL VENOUS BLD VENIPUNCTURE: CPT

## 2018-10-15 PROCEDURE — 84443 ASSAY THYROID STIM HORMONE: CPT

## 2018-10-15 PROCEDURE — 84295 ASSAY OF SERUM SODIUM: CPT

## 2018-10-15 PROCEDURE — 99284 EMERGENCY DEPT VISIT MOD MDM: CPT

## 2018-10-15 PROCEDURE — 71045 X-RAY EXAM CHEST 1 VIEW: CPT

## 2018-10-15 RX ORDER — SODIUM CHLORIDE 9 MG/ML
1000 INJECTION, SOLUTION INTRAVENOUS
Qty: 0 | Refills: 0 | Status: DISCONTINUED | OUTPATIENT
Start: 2018-10-15 | End: 2018-10-15

## 2018-10-15 RX ORDER — SODIUM CHLORIDE 9 MG/ML
1000 INJECTION, SOLUTION INTRAVENOUS
Qty: 0 | Refills: 0 | Status: COMPLETED | OUTPATIENT
Start: 2018-10-15 | End: 2018-10-15

## 2018-10-15 RX ORDER — MAGNESIUM OXIDE 400 MG ORAL TABLET 241.3 MG
400 TABLET ORAL ONCE
Qty: 0 | Refills: 0 | Status: COMPLETED | OUTPATIENT
Start: 2018-10-15 | End: 2018-10-15

## 2018-10-15 RX ORDER — SODIUM CHLORIDE 9 MG/ML
1000 INJECTION INTRAMUSCULAR; INTRAVENOUS; SUBCUTANEOUS ONCE
Qty: 0 | Refills: 0 | Status: COMPLETED | OUTPATIENT
Start: 2018-10-15 | End: 2018-10-15

## 2018-10-15 RX ORDER — ONDANSETRON 8 MG/1
4 TABLET, FILM COATED ORAL ONCE
Qty: 0 | Refills: 0 | Status: COMPLETED | OUTPATIENT
Start: 2018-10-15 | End: 2018-10-15

## 2018-10-15 RX ADMIN — SODIUM CHLORIDE 3000 MILLILITER(S): 9 INJECTION INTRAMUSCULAR; INTRAVENOUS; SUBCUTANEOUS at 12:32

## 2018-10-15 RX ADMIN — MAGNESIUM OXIDE 400 MG ORAL TABLET 400 MILLIGRAM(S): 241.3 TABLET ORAL at 14:16

## 2018-10-15 RX ADMIN — ONDANSETRON 4 MILLIGRAM(S): 8 TABLET, FILM COATED ORAL at 12:33

## 2018-10-15 RX ADMIN — Medication 50 MILLIGRAM(S): at 12:33

## 2018-10-15 RX ADMIN — SODIUM CHLORIDE 200 MILLILITER(S): 9 INJECTION, SOLUTION INTRAVENOUS at 13:35

## 2018-10-15 NOTE — ED PROVIDER NOTE - CONSTITUTIONAL, MLM
normal... Disheveled appearing, thin, awake, alert, oriented to person, place, time/situation and in no apparent distress.

## 2018-10-15 NOTE — ED PROVIDER NOTE - MEDICAL DECISION MAKING DETAILS
Patient in ED w concern for chest wall discomfort s/p mechanical fall several days ago.  CXR reviewed and without evidence of fracture or pneumothorax.  I spoke with radiologist in reading room who also agrees x ray without evidence of acute process.  Of note, patient is not given pain medications as she states she has been drinking this evening and appears comfortable.  Results are discussed with patient and significant other who are both asleep in ED.  Patient is encouraged to follow up with her primary physician in several days for re evaluation and instructed to return immediately to ED should symptoms worsen or if there is any concern prior to this recommended follow up.  Patient aware of plan and verbalizes her understanding.  Will discharge home at this time.

## 2018-10-15 NOTE — ED ADULT NURSE NOTE - OBJECTIVE STATEMENT
pt. presented with c/o Rt side ribs pain s/p fall a week ago. pt. appears intoxicated, gait unsteady, pt. states she drinks every day and needs to quit because she has liver problems. pt. is sleepy, speech slurred, skin warm dry, poor hygiene, pt. states she is homeless, pt. is accompanied by a friend.

## 2018-10-15 NOTE — ED PROVIDER NOTE - MUSCULOSKELETAL, MLM
Spine appears normal, range of motion is not limited, + ttp over anterior chest wall, no overlying erythema, ecchymosis or paradoxical movement to chest wall

## 2018-10-15 NOTE — ED ADULT NURSE NOTE - OBJECTIVE STATEMENT
56 y/o female w/ hx of ETOH abuse presents to the ED c/o withdrawal sx. Pt reports feeling shaky. PT was evaluated in the ED this AM and d/c for fall. Denies any other sx. Denies hx of seizures. Pt presents to ED w/ significant other. Pt states, "[place] train [place] I decided to come in and get checked out. I want to stop drinking."

## 2018-10-15 NOTE — ED ADULT NURSE NOTE - CHPI ED NUR SYMPTOMS NEG
no vomiting/no numbness/no abrasion/no bleeding/no weakness/no fever/no deformity/no loss of consciousness/no tingling

## 2018-10-15 NOTE — ED PROVIDER NOTE - OBJECTIVE STATEMENT
57 year old female presents to ED with concern for anterior chest wall pain over the past several days status post mechanical fall per report.  Patient reports to ED today with significant other who she states she lives on the streets with.  She notes they have been drinking this evening and is concerned because they have no place to go tonight.  She is requesting to sleep in emergency department.  She denies headache, abdominal pain, fever, chills, shortness of breath/difficulty breathing or any additional acute complaints or concerns at this time.

## 2018-10-15 NOTE — ED ADULT TRIAGE NOTE - CHIEF COMPLAINT QUOTE
Patient c/o right sided rib pain x 1 week after a fall. Per patent, she went to [facility] afterward to be assessed. Patient admits to alcohol use today. denies drug use.

## 2018-10-15 NOTE — ED PROVIDER NOTE - CARE PLAN
Principal Discharge DX:	Contusion of chest wall, unspecified laterality, initial encounter  Secondary Diagnosis:	Alcohol abuse

## 2018-10-15 NOTE — ED ADULT NURSE NOTE - NSIMPLEMENTINTERV_GEN_ALL_ED
Implemented All Fall with Harm Risk Interventions:  [place] to call system. Call bell, personal items and telephone within reach. Instruct patient to call for assistance. Room bathroom lighting operational. Non-slip footwear when patient is off stretcher. Physically safe environment: no spills, clutter or unnecessary equipment. Stretcher in lowest position, wheels locked, appropriate side rails in place. Provide visual cue, wrist band, yellow gown, etc. Monitor gait and stability. Monitor for mental status changes and reorient to person, place, and time. Review medications for side effects contributing to fall risk. Reinforce activity limits and safety measures with patient and family. Provide visual clues: red socks.

## 2018-10-15 NOTE — ED ADULT NURSE NOTE - CHPI ED NUR SYMPTOMS NEG
no loss of consciousness/no blurred vision/no vomiting/no numbness/no fever/no dizziness/no weakness/no change in level of consciousness/no nausea/no confusion

## 2018-10-15 NOTE — ED PROVIDER NOTE - OBJECTIVE STATEMENT
58 yo F presenting for tremulous hands, concerned in withdrawal.  Reports last drink several hours ago.  Feels anxious.  No hx of DTs or admission for SAWS in past according to patient.  Denies fall, injury, fever, chills, cough, n/v, headache.  Pt just here for ETOH related complaints.

## 2018-10-15 NOTE — ED PROVIDER NOTE - PROGRESS NOTE DETAILS
pt improved, stable gait, no tremor, HD stable, referred to outpt detox.  Pt stable for outpt referral.  Pt and  ok with plan.

## 2018-11-03 ENCOUNTER — EMERGENCY (EMERGENCY)
Facility: HOSPITAL | Age: 58
LOS: 1 days | Discharge: HOME | End: 2018-11-03
Admitting: EMERGENCY MEDICINE
Payer: COMMERCIAL

## 2018-11-03 VITALS
TEMPERATURE: 98 F | RESPIRATION RATE: 16 BRPM | OXYGEN SATURATION: 98 % | SYSTOLIC BLOOD PRESSURE: 146 MMHG | DIASTOLIC BLOOD PRESSURE: 97 MMHG | HEART RATE: 82 BPM | WEIGHT: 80.03 LBS

## 2018-11-03 DIAGNOSIS — Z79.2 LONG TERM (CURRENT) USE OF ANTIBIOTICS: ICD-10-CM

## 2018-11-03 DIAGNOSIS — R09.81 NASAL CONGESTION: ICD-10-CM

## 2018-11-03 DIAGNOSIS — Z79.899 OTHER LONG TERM (CURRENT) DRUG THERAPY: ICD-10-CM

## 2018-11-03 DIAGNOSIS — Z79.1 LONG TERM (CURRENT) USE OF NON-STEROIDAL ANTI-INFLAMMATORIES (NSAID): ICD-10-CM

## 2018-11-03 DIAGNOSIS — J32.9 CHRONIC SINUSITIS, UNSPECIFIED: ICD-10-CM

## 2018-11-03 DIAGNOSIS — Z88.6 ALLERGY STATUS TO ANALGESIC AGENT: ICD-10-CM

## 2018-11-03 PROCEDURE — 99283 EMERGENCY DEPT VISIT LOW MDM: CPT

## 2018-11-03 RX ORDER — ACETAMINOPHEN 500 MG
650 TABLET ORAL ONCE
Qty: 0 | Refills: 0 | Status: COMPLETED | OUTPATIENT
Start: 2018-11-03 | End: 2018-11-03

## 2018-11-03 RX ORDER — FLUTICASONE PROPIONATE 50 MCG
1 SPRAY, SUSPENSION NASAL
Qty: 1 | Refills: 0 | OUTPATIENT
Start: 2018-11-03 | End: 2018-11-07

## 2018-11-03 RX ADMIN — Medication 650 MILLIGRAM(S): at 20:34

## 2018-11-03 NOTE — ED ADULT TRIAGE NOTE - CHIEF COMPLAINT QUOTE
Pt CO Epistaxis x3 days, not actively bleeding at this time.  Pt states "I was seen at [facility] but they didn't do anything so I left and I'm still feel very congested."  Pt denies N/V/D, SOB, Fevers and CP.

## 2018-11-03 NOTE — ED ADULT NURSE NOTE - CHIEF COMPLAINT QUOTE
Pt CO Epistaxis x3 days, not actively bleeding at this time.  Pt states "I was seen at [facility] but they didn't do anything so I left and I'm still feel very congested."  Pt denies N/V/D, SOB, Fevers and CP.

## 2018-11-03 NOTE — ED PROVIDER NOTE - NS ED MD DISPO DISCHARGE CCDA
86 years old female with PMH of CAD s/p stents, HFrEF, Severe AS & TR, A. Fib on Pradaxa, Bioprosthetic MVR, Carotid Stenosis s/p CEA, CKD, HTN, HLD, TIA, Hypothyroidism and Thrombocytopenia sent from [facility] with respiratory distress. As per patient, she has productive cough for last 8 days associated with difficulty in breathing. It started after she went out for dinner with her daughter on [address]. She has chronic leg edema, orthopnea and paroxysmal nocturnal dyspnea - denies any change in these symptoms. She has been taking her medications regularly. Denies fever, sick contacts or recent travel.   She has chronic diarrhea. Denies nausea, vomiting or abdominal pain. Denies any change in frequency of diarrhea.
Patient/Caregiver provided printed discharge information.

## 2018-11-03 NOTE — ED PROVIDER NOTE - OBJECTIVE STATEMENT
58 y/o female with no PMHx is present with nasal congestion and headaches x1 month. Pt reports she feels as if she has been sick on and off for a month. She has been blowing her nose with intermittent epistaxis. Pt reports she feels a lot of pressure and congestion that is causing her to have headaches. She was given pain medication without alleviation of her symptoms. She denies the following: rash, chills, sob, cough, sore throat, dizziness, blurred vision.

## 2018-11-03 NOTE — ED ADULT NURSE NOTE - NSIMPLEMENTINTERV_GEN_ALL_ED
Implemented All Universal Safety Interventions:  [place] to call system. Call bell, personal items and telephone within reach. Instruct patient to call for assistance. Room bathroom lighting operational. Non-slip footwear when patient is off stretcher. Physically safe environment: no spills, clutter or unnecessary equipment. Stretcher in lowest position, wheels locked, appropriate side rails in place.

## 2018-11-03 NOTE — ED PROVIDER NOTE - MEDICAL DECISION MAKING DETAILS
pt with nasal congestion. rhinorrhea. no epistaxis,or septal hematoma. pain with ttp to the max sinus. tm b/l nml. throat nml. lungs clear. x3 weeks of uri symptoms and now with nasal congestion and facial pain. likely sinusitis. no neck/back pain. tolerating po. will treat. advised pmd fu.

## 2019-06-30 NOTE — ED PROVIDER NOTE - MUSCULOSKELETAL, MLM
[facility]  Neurocritical Care  Progress Note    Admit Date: 6/29/2019  Service Date: 06/30/2019  Length of Stay: 1    Subjective:     Chief Complaint: Brain metastasis    History of Present Illness: Patient is a 73 yr old male with a PMH of HTN and DVT who was transferred from an OSH after being found to have multiple hemorrhagic lesions on CTH. Per patient he has been having a HA x 3 days with associated nausea and vomiting. MRI Brain w and wo shows several nonenhancing hemorrhagic lesions in the right occipital lobe, right frontal and parietal lobes, and left frontal lobe with surrounding vasogenic edema. Patient denies history of CA, positive for a history of smoking and ETOH use (6 beers/day). Neurosurgery consulted. Patient started on Dexamethasone. CT Chest/abdomen/pelvis w/wo pending. Will admit to [facility] for close monitoring.    Hospital Course: 6/29/2019: Admitted to [facility]. CT chest/abd/pelvis pending.  06/30/2019 CT with R upper lobe mass and multiple hepatic lesions. Neurologically intact. Step down to hospital medicine.    Interval History: Pulmonology consulted for lung biopsy, recommended IR for liver biopsy.  IR consulted. Step down to hospital medicine.    Review of Systems   Constitutional: Negative for fever.   HENT: Negative for trouble swallowing.    Respiratory: Negative for shortness of breath.    Cardiovascular: Negative for chest pain.   Gastrointestinal: Positive for nausea.   Genitourinary: Negative for difficulty urinating.   Neurological: Positive for headaches. Negative for facial asymmetry, speech difficulty, weakness and numbness.   Psychiatric/Behavioral: Negative for agitation and confusion.       Objective:     Vitals:  Temp: 98.1 °F (36.7 °C)  Pulse: (!) 53  Rhythm: (P) sinus bradycardia  BP: 126/77  MAP (mmHg): 97  Resp: 16  SpO2: (!) 93 %  O2 Device (Oxygen Therapy): room air    Temp  Min: 98.1 °F (36.7 °C)  Max: 98.4 °F (36.9 °C)  Pulse  Min: 53  Max: 86  BP   Min: 115/83  Max: 158/81  MAP (mmHg)  Min: 88  Max: 118  Resp  Min: 10  Max: 21  SpO2  Min: 92 %  Max: 99 %    06/29 0701 - 06/30 0700  In: 2500 [P.O.:100; I.V.:1400]  Out: 850 [Urine:850]   Unmeasured Output  Urine Occurrence: 1  Stool Occurrence: 0  Emesis Occurrence: 1       Physical Exam   Constitutional: No distress.   Eyes: EOM are normal.   Cardiovascular: Normal rate.   Pulmonary/Chest: Effort normal.   Neurological: He is alert. GCS eye subscore is 4. GCS verbal subscore is 5. GCS motor subscore is 6.   Intubated: No  Sedation: No  Mental Status: Alert and oriented x3. Follows commands. Speech normal.  Brainstem: Intact  CN: II-XII intact  Motor: Moves all four extremities spontaneously and antigravity.  Sensory: Normal  Proprioception: Not tested  Coordination: Not tested  Gait: Not tested.       Nursing note and vitals reviewed.      Medications:  Continuous Scheduled  amLODIPine 10 mg Daily   dexamethasone 4 mg Q6H   famotidine 20 mg Daily   folic acid 1 mg Daily   senna-docusate 8.6-50 mg 1 tablet BID   thiamine 100 mg Daily   PRN  acetaminophen 650 mg Q6H PRN   Dextrose 10% Bolus 12.5 g PRN   Dextrose 10% Bolus 25 g PRN   glucagon (human recombinant) 1 mg PRN   glucose 16 g PRN   glucose 24 g PRN   hydrALAZINE 10 mg Q6H PRN   insulin aspart U-100 1-10 Units QID (AC + HS) PRN   magnesium oxide 800 mg PRN   magnesium oxide 800 mg PRN   ondansetron 4 mg Q8H PRN   oxyCODONE 5 mg Q6H PRN   potassium chloride 10% 40 mEq PRN   potassium chloride 10% 40 mEq PRN   potassium chloride 10% 40 mEq PRN   potassium, sodium phosphates 2 packet PRN   potassium, sodium phosphates 2 packet PRN   potassium, sodium phosphates 2 packet PRN   sodium chloride 0.9% 10 mL PRN     Today I personally reviewed pertinent medications, lines/drains/airways, imaging, cardiology results, laboratory results, notably:    Diet  Diet Adult Regular (IDDSI Level 7)  Diet Adult Regular (IDDSI Level 7)    Assessment/Plan:     Neuro  Brain  lesion  -- See brain mets    Nontraumatic multiple localized intracerebral hemorrhages  -See brain metastasis    Vasogenic brain edema  -- 2/2 Brain metastasis    Psychiatric  Alcohol abuse, daily use  -- Drinks 6 beers/day  -- Monitor for withdrawal  -- On thiamine and folic acid.    Pulmonary  Mass of upper lobe of right lung  -- CT chest with a large spiculated mass in the right upper lobe of the lung concerning for malignancy.  -- Hem/Onc following. Recommending Rad Onc and Palliative consult.  -- Pulmonology consulted for lung biopsy. Recommended IR consult for liver biopsy    Cardiac/Vascular  Essential hypertension  -- SBP Goal < 160  -- Home amlodipine started    Hematology  History of DVT (deep vein thrombosis)  -- History of DVT. Not on AC  -- US LE with superficial acute/subacute thrombosis in the R saphenous vein and evidence of chronic DVT in the LLE with decreased clot burden compared to 2014.   -- Holding off on AC for now given hemorrhagic lesion.    Oncology  * Brain metastasis  73 yr old male with a significant smoking history with multiple non enhancing hemorrhagic brain lesions in b/l frontal, R occipital and R parietal (high suspicion for mets) with vasogenic edema on MRI brain.   CT chest/abd/pelvis with a mass in the right upper lobe and multiple hepatic lesions.    -- Admitted to [facility]  -- NSGY and Hem/Onc following, appreciate recs.  -- Dex 4mg q6  -- SBP<160, continue amlodipine 10mg daily  -- Neuro checks q1  -- PT/OT  -- On a diet  -- Hold off on sc heparin.    Step down to hospital medicine    Liver metastasis  -- CT with multiple ennhacing hepatic lesions, likely mets.  -- IR consult for biopsy    Other  Tobacco use disorder  -- History of smoking 1PPD  -- May need Nicotine patch  -- Smoking cessation counseling when appropriate          The patient is being Prophylaxed for:  Venous Thromboembolism with: Mechanical  Stress Ulcer with: H2B  Ventilator Pneumonia with: none    Activity  Orders          Diet Adult Regular (IDDSI Level 7): Regular starting at 06/30 1001    Commode at bedside starting at 06/29 6694        Full Code    Khris Ballesteros MD  Neurocritical Care  [facility]     Spine appears normal, range of motion is not limited, no muscle or joint tenderness

## 2019-08-12 NOTE — ED PROVIDER NOTE - NS ED MD DISPO ADMIT [facility] PALLIATIVE CARE
Pt made NPO due to coughing and complaining that he had something stuck in his throat. Pt has swallow evaluation ordered. Antonieta NONE

## 2019-10-05 ENCOUNTER — HOSPITAL ENCOUNTER (INPATIENT)
Dept: HOSPITAL 74 - YASAS | Age: 59
LOS: 3 days | Discharge: OTHER INSTITUTION | End: 2019-10-08
Attending: ALLERGY & IMMUNOLOGY | Admitting: ALLERGY & IMMUNOLOGY
Payer: COMMERCIAL

## 2019-10-05 VITALS — BODY MASS INDEX: 15.5 KG/M2

## 2019-10-05 DIAGNOSIS — F33.42: ICD-10-CM
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
## 2019-10-05 DIAGNOSIS — Z88.5: ICD-10-CM

## 2019-10-05 DIAGNOSIS — K21.9: ICD-10-CM

## 2019-10-05 DIAGNOSIS — J45.909: ICD-10-CM

## 2019-10-05 DIAGNOSIS — D72.819: ICD-10-CM

## 2019-10-05 DIAGNOSIS — F10.24: ICD-10-CM

## 2019-10-05 DIAGNOSIS — Z88.8: ICD-10-CM

## 2019-10-05 DIAGNOSIS — Z91.018: ICD-10-CM

## 2019-10-05 DIAGNOSIS — F10.282: ICD-10-CM

## 2019-10-05 DIAGNOSIS — F10.230: Primary | ICD-10-CM

## 2019-10-05 DIAGNOSIS — Z62.810: ICD-10-CM
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
## 2019-10-05 PROCEDURE — HZ2ZZZZ DETOXIFICATION SERVICES FOR SUBSTANCE ABUSE TREATMENT: ICD-10-PCS | Performed by: ALLERGY & IMMUNOLOGY

## 2019-10-05 RX ADMIN — Medication PRN MG: at 22:19

## 2019-10-05 RX ADMIN — Medication SCH MG: at 22:19

## 2019-10-05 NOTE — HP
CIWA Score


Nausea/Vomitin


Muscle Tremors: 2


Anxiety: 2


Agitation: 2


Paroxysmal Sweats: 2


Orientation: 0-Oriented


Tacttile Disturbances: 2-Mild Itch/Numbness/Burn


Auditory Disturbances: 0-None


Visual Disturbances: 0-None


Headache: 2-Mild


CIWA-Ar Total Score: 14





- Admission Criteria


OASAS Guidelines: Admission for Medically Managed Detox: 


Requires at least one of the followin. CIWA greater than 12


2. Seizures within the past 24 hours


3. Delirium tremens within the past 24 hours


4. Hallucinations within the past 24 hours


5. Acute intervention needed for co  occurring medical disorder


6. Acute intervention needed for co  occurring psychiatric disorder


7. Severe withdrawal that cannot be handled at a lower level of care (continued


    vomiting, continued diarrhea, abnormal vital signs) requiring intravenous


    medication and/or fluids


8. Pregnancy





Patient presents the following: CIWA greater than 12


Admission Criteria Met: Admission criteria met





Admitting History and Physical





- Past Medical History


...LMP: 11





- Smoking History


Smoking history: Never smoked


Have you smoked in the past 12 months: No





- Alcohol/Substance Use


Hx Alcohol Use: Yes (DAILY BEER)





Admission [facility]


Chief Complaint: 





"I came in because I need help because of alcohol"


Allergies/Adverse Reactions: 


 Allergies











Allergy/AdvReac Type Severity Reaction Status Date / Time


 


codeine Allergy Severe Swelling Verified 10/05/19 17:48


 


mushroom Allergy Severe Swelling Verified 10/05/19 17:48


 


MUSCLE RELAXANTS AdvReac Mild Nausea Uncoded 10/05/19 17:48











History of Present Illness: 





58 year old woman with alcohol dependence presents for detox. Her last 

treatment at [facility] was in . She was admitted to [facility] on  for 

detox but she signed out AMA same day. She has requested for rehab following 

her detox treatment.


Exam Limitations: No Limitations





- Ebola screening


Have you traveled outside of the country in the last 21 days: No (N)


Have you had contact with anyone from an Ebola affected area: No


Have you been sick,other than usual withdrawal symptoms: No


Do you have a fever: No





- Review of Systems


Constitutional: Chills, Loss of Appetite, Changes in sleep


EENT: reports: Blurred Vision


Respiratory: reports: Cough


Cardiac: reports: No Symptoms Reported


GI: reports: Diarrhea, Nausea, Poor Appetite, Vomiting, Abdominal cramping


: reports: No Symptoms Reported


Musculoskeletal: reports: Muscle Pain, Muscle Weakness


Integumentary: reports: Flushing


Neuro: reports: Headache, Numbness


Endocrine: reports: No Symptoms Reported


Hematology: reports: Anemia


Psychiatric: reports: Anxious, Depressed


Other Systems: Reviewed and Negative





Patient History





- Patient Medical History


Hx Anemia: No


Hx Asthma: Yes


Hx Chronic Obstructive Pulmonary Disease (COPD): No


Hx Cancer: No


Hx Cardiac Disorders: No


Hx Congestive Heart Failure: No


Hx Hypertension: No


Hx Hypercholesterolemia: No


Hx Pacemaker: No


HX Cerebrovascular Accident: No


Hx Seizures: No


Hx Dementia: No


Hx Diabetes: No


Hx Gastrointestinal Disorders: Yes (GERD)


Hx Liver Disease: No


Hx Genitourinary Disorders: No


Hx Sexually Transmitted Disorders: No


Hx Renal Disease (ESRD): No


Hx Thyroid Disease: No


Hx Human Immunodeficiency Virus (HIV): No


Hx Hepatitis C: No


Hx Depression: Yes


Hx Suicide Attempt: No


Hx Bipolar Disorder: Yes


Hx Schizophrenia: No





- Patient Surgical History


Past Surgical History: Yes


Hx Neurologic Surgery: No


Hx Cataract Extraction: No


Hx Cardiac Surgery: No


Hx Lung Surgery: No


Hx Breast Surgery: No


Hx Breast Biopsy: No


Hx Abdominal Surgery: No


Hx Appendectomy: No


Hx Cholecystectomy: No


Hx Genitourinary Surgery: No


Hx  Section: No


Hx Orthopedic Surgery: No


Hx Hysterectomy: No


Other Surgical History: Multiple facial fx sx in  fx L ring finger sx in 


Anesthesia Reaction: No





- PPD History


Previous Implant?: No


Documented Results: Negative w/o proof


Implanted On Prior [facility] Admission?: No


PPD to be Administered?: Yes





- Reproductive History


Patient is a Female of Child Bearing Age (11 -55 yrs old): Yes


Last Menstrual Period: 13


Patient Pregnant: No





- Smoking Cessation


Smoking history: Never smoked


Have you smoked in the past 12 months: No


Hx Chewing Tobacco Use: No


Initiated information on smoking cessation: No





- Substances abused


  ** Alcohol


Substance route: Oral


Frequency: Daily


Amount used: 1 - 6 pack/day


Age of first use: 23


Date of last use: 10/05/19





Admission Physical Exam [facility]





- Vital Signs


Vital Signs: 


 Vital Signs - 24 hr











  10/05/19





  16:03


 


Temperature 97.0 F L


 


Pulse Rate 98 H


 


Respiratory 18





Rate 


 


Blood Pressure 116/74














- Physical


General Appearance: Yes: Thin, Anxious


HEENTM: Yes: Hearing grossly Normal, Normocephalic, Normal Voice, Other (

edentulous)


Respiratory: Yes: Chest Non-Tender, Lungs Clear, No Respiratory Distress, No 

Accessory Muscle Use


Neck: Yes: No masses,lesions,Nodules, Supple


Breast: Yes: Breast Exam Deferred


Cardiology: Yes: Regular Rhythm, Regular Rate, S1, S2


Abdominal: Yes: Normal Bowel Sounds, Non Tender, Flat, Soft


Genitourinary: Yes: Within Normal Limits


Back: Yes: Normal Inspection


Musculoskeletal: Yes: Muscle Pain, Muscle weakness


Neurological: Yes: Fully Oriented, Normal Mood/Affect, Normal Response, Other (

mild left arm weakness left knee weakness)


Integumentary: Yes: Warm


Lymphatic: Yes: Within Normal Limits





- Diagnostic


(1) Alcohol dependence, uncomplicated


Current Visit: Yes   Status: Acute   





(2) H/O gastroesophageal reflux (GERD)


Current Visit: No   Status: Acute   





(3) Major depressive disorder, recurrent episode, in full remission


Current Visit: No   Status: Acute   





Cleared for Admission [facility]





- Detox or Rehab


[facility] Level of Care: Medically Managed


Detox Regimen/Protocol: Librium


Claeared for Rehab Admission: No





Inpatient Rehab Admission





- Rehab Decision to Admit


Inpatient rehab admission?: No

## 2019-10-06 LAB
ALBUMIN SERPL-MCNC: 3.7 G/DL (ref 3.4–5)
ALP SERPL-CCNC: 87 U/L (ref 45–117)
ALT SERPL-CCNC: 19 U/L (ref 13–61)
ANION GAP SERPL CALC-SCNC: 8 MMOL/L (ref 8–16)
AST SERPL-CCNC: 40 U/L (ref 15–37)
BILIRUB SERPL-MCNC: 0.7 MG/DL (ref 0.2–1)
BUN SERPL-MCNC: 3.6 MG/DL (ref 7–18)
CALCIUM SERPL-MCNC: 8.6 MG/DL (ref 8.5–10.1)
CHLORIDE SERPL-SCNC: 104 MMOL/L (ref 98–107)
CO2 SERPL-SCNC: 27 MMOL/L (ref 21–32)
CREAT SERPL-MCNC: 0.5 MG/DL (ref 0.55–1.3)
DEPRECATED RDW RBC AUTO: 21.8 % (ref 11.6–15.6)
GLUCOSE SERPL-MCNC: 92 MG/DL (ref 74–106)
HCT VFR BLD CALC: 30.6 % (ref 32.4–45.2)
HGB BLD-MCNC: 9.9 GM/DL (ref 10.7–15.3)
MCH RBC QN AUTO: 25.4 PG (ref 25.7–33.7)
MCHC RBC AUTO-ENTMCNC: 32.2 G/DL (ref 32–36)
MCV RBC: 78.7 FL (ref 80–96)
PLATELET # BLD AUTO: 62 K/MM3 (ref 134–434)
PMV BLD: 7.4 FL (ref 7.5–11.1)
POTASSIUM SERPLBLD-SCNC: 3.4 MMOL/L (ref 3.5–5.1)
PROT SERPL-MCNC: 8.1 G/DL (ref 6.4–8.2)
RBC # BLD AUTO: 3.89 M/MM3 (ref 3.6–5.2)
SODIUM SERPL-SCNC: 139 MMOL/L (ref 136–145)
WBC # BLD AUTO: 2.9 K/MM3 (ref 4–10)

## 2019-10-06 RX ADMIN — Medication SCH MG: at 22:16

## 2019-10-06 RX ADMIN — Medication SCH TAB: at 10:27

## 2019-10-06 RX ADMIN — Medication PRN MG: at 22:16

## 2019-10-07 RX ADMIN — Medication PRN MG: at 22:04

## 2019-10-07 RX ADMIN — Medication SCH TAB: at 10:07

## 2019-10-07 RX ADMIN — Medication SCH MG: at 22:03
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
## 2019-10-08 ENCOUNTER — HOSPITAL ENCOUNTER (INPATIENT)
Dept: HOSPITAL 74 - YASAS | Age: 59
LOS: 6 days | Discharge: LEFT BEFORE DISCHARGE | DRG: 770 | End: 2019-10-14
Attending: NEUROMUSCULOSKELETAL MEDICINE & OMM | Admitting: NEUROMUSCULOSKELETAL MEDICINE & OMM
Payer: COMMERCIAL

## 2019-10-08 VITALS — DIASTOLIC BLOOD PRESSURE: 76 MMHG | TEMPERATURE: 96.5 F | SYSTOLIC BLOOD PRESSURE: 118 MMHG | HEART RATE: 95 BPM

## 2019-10-08 DIAGNOSIS — Z88.8: ICD-10-CM

## 2019-10-08 DIAGNOSIS — J45.909: ICD-10-CM

## 2019-10-08 DIAGNOSIS — F10.24: ICD-10-CM

## 2019-10-08 DIAGNOSIS — F10.282: ICD-10-CM

## 2019-10-08 DIAGNOSIS — F10.20: Primary | ICD-10-CM

## 2019-10-08 DIAGNOSIS — Z88.5: ICD-10-CM

## 2019-10-08 DIAGNOSIS — K21.9: ICD-10-CM

## 2019-10-08 DIAGNOSIS — Z91.018: ICD-10-CM

## 2019-10-08 PROCEDURE — HZ42ZZZ GROUP COUNSELING FOR SUBSTANCE ABUSE TREATMENT, COGNITIVE-BEHAVIORAL: ICD-10-PCS | Performed by: ALLERGY & IMMUNOLOGY

## 2019-10-08 RX ADMIN — Medication SCH MG: at 22:29

## 2019-10-08 RX ADMIN — FERROUS SULFATE TAB EC 324 MG (65 MG FE EQUIVALENT) SCH: 324 (65 FE) TABLET DELAYED RESPONSE at 21:56

## 2019-10-08 RX ADMIN — Medication PRN MG: at 22:29

## 2019-10-08 RX ADMIN — Medication SCH TAB: at 09:40

## 2019-10-08 RX ADMIN — POTASSIUM CHLORIDE SCH MEQ: 1500 TABLET, EXTENDED RELEASE ORAL at 22:29

## 2019-10-08 RX ADMIN — ANALGESIC BALM SCH: 1.74; 4.06 OINTMENT TOPICAL at 22:33

## 2019-10-08 NOTE — DS
[facility] Detox Discharge Summary


Admission Date: 


10/05/19





Discharge Date: 10/08/19





- History


Present History: Alcohol Dependence


Additional Comments: 





SINCE PATIENT REPORTS THAT CURRENT WITHDRAWAL / DETOX SYMPTOMS ARE MINIMAL IN 

DEGREE AND THAT SHE FEELS WELL OVERALL, AT PATIENTS REQUEST, SHE WAS GRANTED 

AN  EARLY DISCHARGE FROM DETOX UNIT TODAY SO THAT SHE MAY PROCEED ON TO 

AFTERCARE PLAN - [facility] ([place], [place]). PATIENT WAS 

DISCHARGED FORM DETOX UNIT IN STABLE MEDICAL CONDITION.


Pertinent Past History: 





G.E.R.D., History Of Depression, Insomnia, Pancytopenia, Elevated AST Level, 

Asthma.





- Physical Exam Results


Vital Signs: 


 Vital Signs











Temperature  97.5 F L  10/08/19 14:07


 


Pulse Rate  73   10/08/19 14:07


 


Respiratory Rate  18   10/08/19 14:07


 


Blood Pressure  126/81   10/08/19 14:07


 


O2 Sat by Pulse Oximetry (%)      











Pertinent Admission Physical Exam Findings: 





WITHDRAWAL SYMPTOMS.





 Laboratory Tests











  10/06/19 10/06/19 10/06/19





  07:30 07:30 07:30


 


WBC  2.9 L  


 


RBC  3.89  


 


Hgb  9.9 L  


 


Hct  30.6 L  


 


MCV  78.7 L  


 


MCH  25.4 L  


 


MCHC  32.2  


 


RDW  21.8 H  


 


Plt Count  62 L D  


 


MPV  7.4 L  


 


Sodium   139 


 


Potassium   3.4 L 


 


Chloride   104 


 


Carbon Dioxide   27 


 


Anion Gap   8 


 


BUN   3.6 L 


 


Creatinine   0.5 L 


 


Est GFR (CKD-EPI)AfAm   125.40 


 


Est GFR (CKD-EPI)NonAf   108.19 


 


Random Glucose   92 


 


Calcium   8.6 


 


Total Bilirubin   0.7 


 


AST   40 H 


 


ALT   19 


 


Alkaline Phosphatase   87 


 


Total Protein   8.1 


 


Albumin   3.7 


 


RPR Titer    Nonreactive








LABS NOTED.





- Treatment


Hospital Course: Detox Protocol Followed, Detoxed Safely, Responded well, 

Discharged Condition Good, Rehab Referral Accepted


Patient has Accepted a Rehab Referral to: [facility] ([place], [place]).





- Medication


Discharge Medications: 


Ambulatory Orders





Albuterol [Ventolin] 17 gm IH Q4H PRN 12 


Famotidine [Pepcid -] 20 mg PO BID 14 


Docusate Sodium [Colace -] 100 mg PO TID 10/05/19 


Hydrocortisone 2.5% Topical Cr [Anusol 2.5% Hc Cream -] 1 applic RC TID 10/05/

19 


Ibuprofen 400 mg PO TID 10/05/19 











- Diagnosis


(1) Alcohol dependence, uncomplicated


Current Visit: Yes   Status: Acute   





(2) Alcohol-induced mood disorder


Current Visit: Yes   Status: Acute   





(3) Alcohol-induced sleep disorder


Current Visit: Yes   Status: Acute   





(4) Asthma


Current Visit: Yes   Status: Acute   





(5) H/O gastroesophageal reflux (GERD)


Current Visit: Yes   Status: Chronic   





- AMA


Did Patient Leave Against Medical Advice: No





S CIWA





- CIWA Score


Nausea/Vomitin-No Nausea/No Vomiting


Muscle Tremors: None


Anxiety: 3


Agitation: 0-Normal Activity


Paroxysmal Sweats: 2


Orientation: 0-Oriented


Tacttile Disturbances: 0-None


Auditory Disturbances: 0-None


Visual Disturbances: 0-None


Headache: 0-None Present


CIWA-Ar Total Score: 5

## 2019-10-08 NOTE — HP
[facility] MD Rehab Assess/Revision





- Admission History


Admitted to Rehab from: [facility]


Date of Admission to Rehab: 10/08/2019





- Vital signs


Vital Signs: 





NOTED; STABLE.





- Findings


Detox History & Physical reviewed: Yes


Concur with findings: Yes


Comments/Additional Findings: PATIENT'S MEDICAL / MEDICATION HISTORY REVIEWED 

PRIOR TO DISCHARGE FROM DETOX UNIT. K-DUR ORDERED OIN REHAB UNIT FOR 

HYPOKALEMAI NOTED ON DETOX ADMISSION LABORATORY ASSESSMENT. FEOSOL ORDERED FOR 

ANEMIA NOTE DON DETOX ADMISSION LABORATRY ASSESSMENT. CB AND K LEVEL TO BE 

CHECKED AGAIN ON 10/10/2091 TO SEE IF ANY CHANGE IN COMPARISON TO DETOX 

ADMISSION CBC AND K LEVELS. PATIENT WAS DISCHARGED FROM DETOX UNIT TO BE TAKEN 

OVER TO REHAB UNIT IN STABLE MEDICAL CONDITION.





Inpatient Rehab Admission





- Rehab Decision to Admit


Inpatient rehab admission?: Yes





- Initial Determination


Are CD services needed?: Yes


Free of communicable disease: Yes


Not in need of hospitalization: No





- Rehab Admission Criteria


Previous failed treatment: Yes


Poor recovery environment: Yes


Comorbidities: Yes


Lacks judgement: No


Patient is meeting Inpatient Rehab admission criteria:: Yes

## 2019-10-09 RX ADMIN — CYPROHEPTADINE HYDROCHLORIDE SCH MG: 4 TABLET ORAL at 10:40

## 2019-10-09 RX ADMIN — Medication SCH MG: at 21:11

## 2019-10-09 RX ADMIN — POTASSIUM CHLORIDE SCH MEQ: 1500 TABLET, EXTENDED RELEASE ORAL at 17:09

## 2019-10-09 RX ADMIN — Medication PRN MG: at 21:11

## 2019-10-09 RX ADMIN — FERROUS SULFATE TAB EC 324 MG (65 MG FE EQUIVALENT) SCH MG: 324 (65 FE) TABLET DELAYED RESPONSE at 12:36

## 2019-10-09 RX ADMIN — FERROUS SULFATE TAB EC 324 MG (65 MG FE EQUIVALENT) SCH MG: 324 (65 FE) TABLET DELAYED RESPONSE at 17:09

## 2019-10-09 RX ADMIN — ANALGESIC BALM SCH APPLIC: 1.74; 4.06 OINTMENT TOPICAL at 21:10

## 2019-10-09 RX ADMIN — FERROUS SULFATE TAB EC 324 MG (65 MG FE EQUIVALENT) SCH MG: 324 (65 FE) TABLET DELAYED RESPONSE at 07:45

## 2019-10-09 RX ADMIN — POTASSIUM CHLORIDE SCH MEQ: 1500 TABLET, EXTENDED RELEASE ORAL at 10:41

## 2019-10-09 RX ADMIN — CYPROHEPTADINE HYDROCHLORIDE SCH MG: 4 TABLET ORAL at 17:09

## 2019-10-09 RX ADMIN — ANALGESIC BALM SCH: 1.74; 4.06 OINTMENT TOPICAL at 11:25

## 2019-10-09 RX ADMIN — Medication SCH TAB: at 10:41

## 2019-10-10 LAB
ANISOCYTOSIS BLD QL: (no result)
BASOPHILS # BLD: 0.6 % (ref 0–2)
DEPRECATED RDW RBC AUTO: 21.4 % (ref 11.6–15.6)
EOSINOPHIL # BLD: 1.4 % (ref 0–4.5)
HCT VFR BLD CALC: 32.6 % (ref 32.4–45.2)
HGB BLD-MCNC: 10.2 GM/DL (ref 10.7–15.3)
LYMPHOCYTES # BLD: 29.7 % (ref 8–40)
MACROCYTES BLD QL: (no result)
MCH RBC QN AUTO: 25.1 PG (ref 25.7–33.7)
MCHC RBC AUTO-ENTMCNC: 31.2 G/DL (ref 32–36)
MCV RBC: 80.4 FL (ref 80–96)
MONOCYTES # BLD AUTO: 12.9 % (ref 3.8–10.2)
NEUTROPHILS # BLD: 55.4 % (ref 42.8–82.8)
PLATELET # BLD AUTO: 109 K/MM3 (ref 134–434)
PLATELET BLD QL SMEAR: (no result)
PMV BLD: 7.8 FL (ref 7.5–11.1)
RBC # BLD AUTO: 4.05 M/MM3 (ref 3.6–5.2)
WBC # BLD AUTO: 4.8 K/MM3 (ref 4–10)

## 2019-10-10 RX ADMIN — ANALGESIC BALM SCH: 1.74; 4.06 OINTMENT TOPICAL at 10:05

## 2019-10-10 RX ADMIN — CYPROHEPTADINE HYDROCHLORIDE SCH MG: 4 TABLET ORAL at 10:05

## 2019-10-10 RX ADMIN — Medication PRN MG: at 21:35

## 2019-10-10 RX ADMIN — Medication SCH TAB: at 10:05

## 2019-10-10 RX ADMIN — FERROUS SULFATE TAB EC 324 MG (65 MG FE EQUIVALENT) SCH MG: 324 (65 FE) TABLET DELAYED RESPONSE at 12:10

## 2019-10-10 RX ADMIN — CYPROHEPTADINE HYDROCHLORIDE SCH MG: 4 TABLET ORAL at 07:41

## 2019-10-10 RX ADMIN — FERROUS SULFATE TAB EC 324 MG (65 MG FE EQUIVALENT) SCH MG: 324 (65 FE) TABLET DELAYED RESPONSE at 17:08

## 2019-10-10 RX ADMIN — ANALGESIC BALM SCH APPLIC: 1.74; 4.06 OINTMENT TOPICAL at 21:36

## 2019-10-10 RX ADMIN — Medication SCH MG: at 21:35

## 2019-10-10 RX ADMIN — CYPROHEPTADINE HYDROCHLORIDE SCH MG: 4 TABLET ORAL at 17:07

## 2019-10-10 RX ADMIN — FERROUS SULFATE TAB EC 324 MG (65 MG FE EQUIVALENT) SCH MG: 324 (65 FE) TABLET DELAYED RESPONSE at 07:41

## 2019-10-11 RX ADMIN — FERROUS SULFATE TAB EC 324 MG (65 MG FE EQUIVALENT) SCH MG: 324 (65 FE) TABLET DELAYED RESPONSE at 17:08

## 2019-10-11 RX ADMIN — CYPROHEPTADINE HYDROCHLORIDE SCH MG: 4 TABLET ORAL at 10:10

## 2019-10-11 RX ADMIN — ANALGESIC BALM SCH: 1.74; 4.06 OINTMENT TOPICAL at 21:15

## 2019-10-11 RX ADMIN — Medication SCH MG: at 21:14

## 2019-10-11 RX ADMIN — FERROUS SULFATE TAB EC 324 MG (65 MG FE EQUIVALENT) SCH MG: 324 (65 FE) TABLET DELAYED RESPONSE at 12:52

## 2019-10-11 RX ADMIN — CYPROHEPTADINE HYDROCHLORIDE SCH MG: 4 TABLET ORAL at 06:34

## 2019-10-11 RX ADMIN — FERROUS SULFATE TAB EC 324 MG (65 MG FE EQUIVALENT) SCH MG: 324 (65 FE) TABLET DELAYED RESPONSE at 07:12

## 2019-10-11 RX ADMIN — Medication PRN MG: at 21:14

## 2019-10-11 RX ADMIN — Medication SCH TAB: at 10:10

## 2019-10-11 RX ADMIN — CYPROHEPTADINE HYDROCHLORIDE SCH MG: 4 TABLET ORAL at 17:08

## 2019-10-11 RX ADMIN — ANALGESIC BALM SCH: 1.74; 4.06 OINTMENT TOPICAL at 10:10

## 2019-10-12 RX ADMIN — CYPROHEPTADINE HYDROCHLORIDE SCH MG: 4 TABLET ORAL at 10:04

## 2019-10-12 RX ADMIN — ANALGESIC BALM SCH APPLIC: 1.74; 4.06 OINTMENT TOPICAL at 21:44

## 2019-10-12 RX ADMIN — FERROUS SULFATE TAB EC 324 MG (65 MG FE EQUIVALENT) SCH MG: 324 (65 FE) TABLET DELAYED RESPONSE at 18:31

## 2019-10-12 RX ADMIN — FERROUS SULFATE TAB EC 324 MG (65 MG FE EQUIVALENT) SCH MG: 324 (65 FE) TABLET DELAYED RESPONSE at 11:46

## 2019-10-12 RX ADMIN — ANALGESIC BALM SCH: 1.74; 4.06 OINTMENT TOPICAL at 10:04

## 2019-10-12 RX ADMIN — FERROUS SULFATE TAB EC 324 MG (65 MG FE EQUIVALENT) SCH MG: 324 (65 FE) TABLET DELAYED RESPONSE at 07:23

## 2019-10-12 RX ADMIN — Medication SCH TAB: at 10:04

## 2019-10-12 RX ADMIN — CYPROHEPTADINE HYDROCHLORIDE SCH MG: 4 TABLET ORAL at 18:30

## 2019-10-12 RX ADMIN — Medication PRN MG: at 21:43

## 2019-10-12 RX ADMIN — CYPROHEPTADINE HYDROCHLORIDE SCH MG: 4 TABLET ORAL at 06:32

## 2019-10-12 RX ADMIN — Medication SCH MG: at 21:43

## 2019-10-13 RX ADMIN — FERROUS SULFATE TAB EC 324 MG (65 MG FE EQUIVALENT) SCH MG: 324 (65 FE) TABLET DELAYED RESPONSE at 17:33

## 2019-10-13 RX ADMIN — ANALGESIC BALM SCH: 1.74; 4.06 OINTMENT TOPICAL at 10:03

## 2019-10-13 RX ADMIN — Medication SCH TAB: at 10:03

## 2019-10-13 RX ADMIN — CYPROHEPTADINE HYDROCHLORIDE SCH: 4 TABLET ORAL at 07:18

## 2019-10-13 RX ADMIN — ANALGESIC BALM SCH: 1.74; 4.06 OINTMENT TOPICAL at 21:19

## 2019-10-13 RX ADMIN — Medication SCH MG: at 21:15

## 2019-10-13 RX ADMIN — FERROUS SULFATE TAB EC 324 MG (65 MG FE EQUIVALENT) SCH MG: 324 (65 FE) TABLET DELAYED RESPONSE at 12:11

## 2019-10-13 RX ADMIN — FERROUS SULFATE TAB EC 324 MG (65 MG FE EQUIVALENT) SCH: 324 (65 FE) TABLET DELAYED RESPONSE at 07:18

## 2019-10-13 RX ADMIN — Medication PRN MG: at 21:15

## 2019-10-13 RX ADMIN — CYPROHEPTADINE HYDROCHLORIDE SCH MG: 4 TABLET ORAL at 10:03

## 2019-10-13 RX ADMIN — CYPROHEPTADINE HYDROCHLORIDE SCH MG: 4 TABLET ORAL at 17:33

## 2019-10-14 VITALS — TEMPERATURE: 98.1 F | DIASTOLIC BLOOD PRESSURE: 62 MMHG | HEART RATE: 92 BPM | SYSTOLIC BLOOD PRESSURE: 91 MMHG

## 2019-10-14 RX ADMIN — FERROUS SULFATE TAB EC 324 MG (65 MG FE EQUIVALENT) SCH MG: 324 (65 FE) TABLET DELAYED RESPONSE at 07:23

## 2019-10-14 RX ADMIN — CYPROHEPTADINE HYDROCHLORIDE SCH MG: 4 TABLET ORAL at 07:23

## 2019-10-24 NOTE — PROGRESS NOTE ADULT - PROBLEM/PLAN-1
DISPLAY PLAN FREE TEXT Detail Level: Detailed Scheduled For Follow Up In (Optional): 2-3 months FBSE

## 2019-12-10 NOTE — DISCHARGE NOTE ADULT - CARE PROVIDER_API CALL
Refilled    Thanks    Familia Varner MD    TAT[place],   [facility]  Address: [address] [place] [address]  Phone: [phone]  Phone: (   )    -  Fax: (       -

## 2019-12-14 NOTE — PATIENT PROFILE ADULT. - FALL HARM RISK TYPE OF ASSESSMENT
Patient admitted to the ICU after an out-of-hospital cardiac arrest that occurred around 11:00 am on 12/7/2019.  Total down-time is uncertain but is likely at least 30 minutes based upon 911 => EMS arrival => ROSC information. Course was complicated by encephalopathy, renal failure requiring intermittent HD, and respiratory failure. Encephalopathy slowly improving since admission. CT head negative for acute intracranial abnormalities. Patient tolerating CPAP trials however neuro exam has been precluding extubation. On 12/17, patient with worsening tachycardia and new leukocytosis concern for new infection. Vancomycin and cefepime were started. All lines were removed in attempt to have a line holiday. On 12/18, patient needing HD and temporary trialysis catheter was placed in RIJ. Plans for more permanent access with permacath later this week by IR.     Currently on ventilator day #11     Current antibiotics:   --Vancomycin => 12/7 x 1 dose; 12/18  --Cefepime 12/18  --Piperacillin/tazobactam => 12/7 to 12/10   Admission

## 2020-04-07 NOTE — ED PROVIDER NOTE - CPE EDP RESP NORM
4/7/2020         RE: Parker Acevedo  [address]  [place] [address]        Dear Colleague,    Thank you for referring your patient, Parker Acevedo, to the [facility]. Please see a copy of my visit note below.    Consult requested by Dr. Isaac    Reason for consultation - azevedo placement    HPI:   Patient is a 49-year-old white male status post a Celestino-en-Y gastric bypass with a postoperative course complicated by multiple marginal ulcers requiring multiple revisions and resultant short gut syndrome.  The patient also reports a draining enterocutaneous fistula of approximately a liter a day.  He only gets 300 to 600 carol ann in per day that he can tolerate.  He requires TPN nutritional support as well as fluids.  He recently had a upper extremity PICC line placed and it was removed Friday due to infection around the site.  He was also started antibiotics at that time.  He also tells me he has had multiple ports/Azevedo is placed in both jugulars as well as subclavian's.  He also thinks he might have some form of a central stenosis.  I did review the chart and has had multiple catheters placed in the does appear to be stenosis in the left subclavian.  He is now referred to me for Azevedo placement.    Past Medical History:   Diagnosis Date     ADHD (attention deficit hyperactivity disorder)      Anxiety      Cardiomyopathy in nutritional diseases (H)     mild EF ~45% on rest 2/13/17, improves with stressing     Chronic abdominal pain      CLABSI (central line-associated bloodstream infection)     recurrent     Complication of anesthesia      Difficulty swallowing      Gastric ulcer, unspecified as acute or chronic, without mention of hemorrhage, perforation, or obstruction      Gastro-oesophageal reflux disease      Head injury      Hiatal hernia      Other bladder disorder      Other chronic pain      PONV (postoperative nausea and vomiting)      Severe malnutrition (H)     TPN      Short gut syndrome      Tobacco abuse      Past Surgical History:   Procedure Laterality Date     AMPUTATION       APPENDECTOMY       BACK SURGERY  11/3/2014    curve in the spine     BIOPSY LYMPH NODE CERVICAL N/A 2/20/2015    Procedure: BIOPSY LYMPH NODE CERVICAL;  Surgeon: Baron Scanlon MD;  Location: [facility] OR     C GASTRIC BYPASS,OBESE<100CM SHAYLEE-EN-Y  2002    lost 300 pounds     CHOLECYSTECTOMY       DISCECTOMY, FUSION CERVICAL ANTERIOR ONE LEVEL, COMBINED N/A 2/15/2017    Procedure: COMBINED DISCECTOMY, FUSION CERVICAL ANTERIOR ONE LEVEL;  Surgeon: Darren Campos MD;  Location: [facility]     ENDOSCOPIC INSERTION TUBE GASTROSTOMY  9/9/2013    Procedure: ENDOSCOPIC INSERTION TUBE GASTROSTOMY;;  Surgeon: Francis Vyas MD;  [facility]     ENDOSCOPIC ULTRASOUND UPPER GASTROINTESTINAL TRACT (GI)  4/29/2011    Procedure:ENDOSCOPIC ULTRASOUND UPPER GASTROINTESTINAL TRACT (GI); Both Procedures done Conjointly; Surgeon:NEREIDA HOUSER; [facility]     ENDOSCOPIC ULTRASOUND UPPER GASTROINTESTINAL TRACT (GI)  9/9/2013    Procedure: ENDOSCOPIC ULTRASOUND UPPER GASTROINTESTINAL TRACT (GI);  Endoscopic Ultrasound Guide Gastrostomy Tube Placement  C-arm;  Surgeon: Noe Lizarraga MD;  [facility]     ENDOSCOPY  03/25/11    EGD, [facility]     ENDOSCOPY  08/04/09    Upper Endoscopy, [facility]     ENDOSCOPY  01/05/09    Upper Endoscopy, [facility]     ESOPHAGOSCOPY, GASTROSCOPY, DUODENOSCOPY (EGD), COMBINED  4/20/2011    Procedure:COMBINED ESOPHAGOSCOPY, GASTROSCOPY, DUODENOSCOPY (EGD); Surgeon:FRANCIS VYAS; [facility]     ESOPHAGOSCOPY, GASTROSCOPY, DUODENOSCOPY (EGD), COMBINED  6/15/2011    Procedure:COMBINED ESOPHAGOSCOPY, GASTROSCOPY, DUODENOSCOPY (EGD); Surgeon:FRANCIS VYAS; [facility]     ESOPHAGOSCOPY, GASTROSCOPY, DUODENOSCOPY (EGD), COMBINED  6/12/2013    Procedure: COMBINED ESOPHAGOSCOPY, GASTROSCOPY, DUODENOSCOPY (EGD);;  Surgeon:  Francis Vyas MD;  Location:  GI     ESOPHAGOSCOPY, GASTROSCOPY, DUODENOSCOPY (EGD), COMBINED  11/22/2013    Procedure: COMBINED ESOPHAGOSCOPY, GASTROSCOPY, DUODENOSCOPY (EGD);;  Surgeon: Francis Vyas MD;  [facility]     ESOPHAGOSCOPY, GASTROSCOPY, DUODENOSCOPY (EGD), COMBINED  4/30/2014    Procedure: COMBINED ESOPHAGOSCOPY, GASTROSCOPY, DUODENOSCOPY (EGD);  Surgeon: Francis Vyas MD;  Location:  GI     ESOPHAGOSCOPY, GASTROSCOPY, DUODENOSCOPY (EGD), COMBINED N/A 2/20/2015    Procedure: COMBINED ESOPHAGOSCOPY, GASTROSCOPY, DUODENOSCOPY (EGD), BIOPSY SINGLE OR MULTIPLE;  Surgeon: Baron Scanlon MD;  Location:  OR     ESOPHAGOSCOPY, GASTROSCOPY, DUODENOSCOPY (EGD), COMBINED N/A 9/30/2015    Procedure: COMBINED ESOPHAGOSCOPY, GASTROSCOPY, DUODENOSCOPY (EGD);  Surgeon: Francis Vyas MD;  Location:  GI     ESOPHAGOSCOPY, GASTROSCOPY, DUODENOSCOPY (EGD), COMBINED N/A 10/3/2019    Procedure: Upper Endoscopy;  Surgeon: Clif Morrow MD;  Location:  OR     GASTRECTOMY  6/22/2012    Procedure: GASTRECTOMY;  Open Approach, Excise Ulcers,Partial Gastrectomy, Esophagojejunostomy, Hiatal Hernia Repair, Extensive Lysis of Adhesions and Esaphagogastrodudenoscopy.;  Surgeon: Francis Vyas MD;  [facility]     GASTROJEJUNOSTOMY  08/26/09    Extensice enterolysis, partial resect. jejunum, part. resect gastric pouch, gastrojejunostomy anastomosis     HC ESOPH/GAS REFLUX TEST W NASAL IMPED ELECTRODE  8/5/2013    Procedure: ESOPHAGEAL IMPEDENCE FUNCTION TEST 1 HOUR OR LESS;  Surgeon: Halie Lang MD;  Location:  GI     HEAD & NECK SURGERY  2/15/2017    C5-C6     HERNIA REPAIR  2006    Umbilical hernia     HERNIORRHAPHY HIATAL  6/22/2012    Procedure: HERNIORRHAPHY HIATAL;;  Surgeon: Francis Vyas MD;  [facility]     HERNIORRHAPHY INGUINAL  11/22/2013    Procedure: HERNIORRHAPHY INGUINAL;;  Surgeon: Francis Vyas MD;  Location:  OR      INSERT PICC LINE Right 12/19/2019    Procedure: Picc Placement;  Surgeon: Per Dumont PA-C;  [facility]     INSERT PICC LINE Right 2/21/2020    Procedure: INSERTION, PICC;  Surgeon: Per Dumont PA-C;  [facility]     INSERT PORT VASCULAR ACCESS Right 12/19/2017    Procedure: INSERT PORT VASCULAR ACCESS;  Right Chest Port Placement ;  Surgeon: Lisandro Alejandro PA-C;  [facility]     INSERT PORT VASCULAR ACCESS Right 8/2/2018    Procedure: INSERT PORT VASCULAR ACCESS;  Place single lumen tunneled central venous access catheter;  Surgeon: Guy Jamil PA-C;  [facility]     IR CVC TUNNEL PLACEMENT > 5 YRS OF AGE  8/7/2019     IR CVC TUNNEL REMOVAL RIGHT  10/1/2019     IR FOLLOW UP VISIT OUTPATIENT  8/7/2019     IR PICC PLACEMENT > 5 YRS OF AGE  3/7/2019     IR PICC PLACEMENT > 5 YRS OF AGE  12/19/2019     IR PICC PLACEMENT > 5 YRS OF AGE  2/21/2020     LAPAROTOMY EXPLORATORY  11/22/2013    Procedure: LAPAROTOMY EXPLORATORY;  Exploratory Laparotomy, Upper Endoscopy, Left Inguinal Hernia Repair;  Surgeon: Francis Vyas MD;  [facility]     ORTHOPEDIC SURGERY       PICC INSERTION Right 03/16/2017    5fr DL BioFlo PICC, 42cm (3cm external) in the R medial brachial vein w/ tip in the SVC RA junction.     PICC INSERTION Left 09/23/2017    5fr DL BioFlo PICC, 45cm (1cm external) in the L basilic vein w/ tip in the SVC RA junction.     PICC INSERTION Right 05/16/2019    5Fr - 43cm, Medial brachial vein, low SVC     PICC INSERTION Right 10/02/2019    5Fr - 43cm (2cm external), basilic vein, low SVC     SHAYLEE EN Y BOWEL  2003     SOFT TISSUE SURGERY       THORACIC SURGERY       TONSILLECTOMY       TRANSESOPHAGEAL ECHOCARDIOGRAM INTRAOPERATIVE N/A 1/8/2019    Procedure: TRANSESOPHAGEAL ECHOCARDIOGRAM INTRAOPERATIVE;  Surgeon: GENERIC ANESTHESIA PROVIDER;  Location: [facility]     Current Outpatient Medications   Medication     acetaminophen (TYLENOL) 32 mg/mL liquid      "albuterol (PROAIR HFA/PROVENTIL HFA/VENTOLIN HFA) 108 (90 Base) MCG/ACT inhaler     amphetamine-dextroamphetamine (ADDERALL) 20 MG tablet     carvedilol (COREG) 6.25 MG tablet     cyanocobalamin (CYANOCOBALAMIN) 1000 MCG/ML injection     [facility] INFUSION MANAGED PATIENT     fentaNYL (DURAGESIC) 25 mcg/hr 72 hr patch     lidocaine (LIDODERM) 5 % Patch     lidocaine, alum & mag hydroxide-simethicone GI Cocktail     LORazepam (ATIVAN) 1 MG tablet     naloxone (NARCAN) 4 MG/0.1ML nasal spray     Needle, Disp, (BD DISP NEEDLES) 27G X 1/2\" MISC     ondansetron (ZOFRAN-ODT) 8 MG ODT tab     order for DME     order for DME     oxyCODONE (ROXICODONE) 5 MG/5ML solution     pantoprazole (PROTONIX) 40 MG EC tablet     polyethylene glycol (MIRALAX) powder     sodium chloride 0.9% infusion     sucralfate (CARAFATE) 1 GM tablet     UNABLE TO FIND     vitamin D2 (ERGOCALCIFEROL) 86181 units (1250 mcg) capsule     No current facility-administered medications for this visit.         Allergies   Allergen Reactions     Bactrim [Sulfamethoxazole W/Trimethoprim] Rash     Penicillins Anaphylaxis     Please see Antimicrobial Management Team allergy assessment note 10/10/2018. Patient reported tolerating amoxicillin.     Doxycycline Rash     Vancomycin Rash     Rash after receiving vancomycin 3/28/16 (red man's?). Tolerated with slower infusion and diphenhydramine premed.     Social History     Socioeconomic History     Marital status:      Spouse name: Rose     Number of children: 1     Years of education: Not on file     Highest education level: Not on file   Occupational History     Not on file   Social Needs     Financial resource strain: Not hard at all     Food insecurity     Worry: Never true     Inability: Never true     Transportation needs     Medical: No     Non-medical: No   Tobacco Use     Smoking status: Current Some Day Smoker     Packs/day: 0.25     Years: 3.00     Pack years: 0.75     Types: Cigarettes     " Last attempt to quit: 2018     Years since quittin.6     Smokeless tobacco: Never Used     Tobacco comment: I use an e cig every now and than   Substance and Sexual Activity     Alcohol use: No     Comment: quit      Drug use: No     Sexual activity: Yes     Partners: Female     Birth control/protection: None     Comment: no protection   Lifestyle     Physical activity     Days per week: 3 days     Minutes per session: 10 min     Stress: Not on file   Relationships     Social connections     Talks on phone: Not on file     Gets together: Not on file     Attends Restorationism service: Not on file     Active member of club or organization: Not on file     Attends meetings of clubs or organizations: Not on file     Relationship status: Not on file     Intimate partner violence     Fear of current or ex partner: Not on file     Emotionally abused: Not on file     Physically abused: Not on file     Forced sexual activity: Not on file   Other Topics Concern     Parent/sibling w/ CABG, MI or angioplasty before 65F 55M? No   Social History Narrative     Not on file     Family History   Problem Relation Age of Onset     Gastrointestinal Disease Mother         Crohns disease     Anxiety Disorder Mother      Thyroid Disease Mother         Grave's disease     Cancer Father         ear cancer-skin cancer/melanoma     Breast Cancer Maternal Grandmother      Macular Degeneration Maternal Grandfather      Anxiety Disorder Sister      Diabetes Maternal Uncle      Breast Cancer Other      Hypertension No family hx of      Hyperlipidemia No family hx of      Cerebrovascular Disease No family hx of      Prostate Cancer No family hx of      Depression No family hx of      Anesthesia Reaction No family hx of      Asthma No family hx of      Osteoporosis No family hx of      Genetic Disorder No family hx of      Obesity No family hx of      Mental Illness No family hx of      Substance Abuse No family hx of      Glaucoma No  family hx of       ROS: 10 point ROS neg other than the symptoms noted above in the HPI.    PE:  B/P: 136/85, T: Data Unavailable, P: 89, R: Data Unavailable  General: well developed, well nourished WM who appears their stated age  HEENT: NC/AT, EOMI, (-)icterus, (-)injection  Neck: Supple, No JVD, multiple incisions  Chest: CTA, multiple scars  Heart: S1, S2, (-)m/r/g  Abd: Soft, non tender, non distended, non tender, no masses, EC fistula LUQ   Ext; Warm, no edema  Psych: AAOx3  Neuro: No focal deficits    Impression/plan:  Patient is a 49-year-old white male status post a Celestino-en-Y gastric bypass with a complicated postoperative course with resulting in short gut syndrome and requiring TPN for nutritional support.  He has had multiple central access these in the past and a known central stenosis.  I discussed with the patient I do not have the equipment or ability to do the Cm in [place].  He may require venoplasty the place the catheter.  The plan at this time is [facility] for placement of a central catheter to facilitate nutrition.  He will be managed with a peripheral IV in the meantime though there is only last about 12 hours for him.  He knows to go to the ER should to be any further issues.  He will follow-up with me as necessary.    Parker to follow up with Primary Care provider regarding elevated blood pressure.    Drew Oseguera MD, FACS    Again, thank you for allowing me to participate in the care of your patient.        Sincerely,        Drew Oseguera MD     normal...

## 2020-11-07 NOTE — ED ADULT TRIAGE NOTE - HEIGHT IN FEET
[id number]- INTUBATION PREP TIME

1347-SUCC 175 MG /88, 52, 18, 100%

1346 KETAMINE 200 MG IV

BREATH SOUNDS HEARD PER STETHOSCOPE AND RISE OF CHEST EVEN.

1353 CHAD CALLED SHON FOR A ICU BED

1355 BP 71/50

1400 NS BOLUS STARTED

1410 KETAMINE 100 MG IV GIVEN


-------------------------------------------------------------------------------

Addendum: 11/07/20 at 1426 by LORENA

-------------------------------------------------------------------------------

1410 100 MG KETAMINE GIVEN

1424 100 MG OF KETAMINE GIVEN 4

## 2020-12-07 NOTE — ED PROVIDER NOTE - CARDIAC, MLM
PT CALLED STATING SHE WAS RELEASED FROM THE HOSPITAL ON 11/30. WHEN RECEIVING THE DISCHARGE PAPERS, IT STATED SHE NEEDED TO TAKE THESE 2 MEDICATIONS AND PT DOES NOT HAVE THEM. CORG 3.125 XANAX 0.5 MG WILL NEED TO BE CALLED IN FOR PT.     PT ALSO STATES THAT SHE IS TAKING 3 DIFFERENT  BLOOD PRESSURE MEDICATIONS  amLODIPine (NORVASC) 5 MG tablet, metoprolol succinate XL (TOPROL-XL) 25 MG 24 hr tablet, AND lisinopril (PRINIVIL,ZESTRIL) 40 MG tablet  DOES PT NEED TO TAKE ALL 3 MEDICATIONS.     PLEASE ADVISE     [facility]   [id number] - [place], [place] - [address] AT [facility] [address] [facility] [address] [facility] - [phone]  - [phone]  [phone]    PT CALL BACK   [phone]   Normal rate, regular rhythm.  Heart sounds S1, S2.  No murmurs, rubs or gallops.

## 2021-01-24 ENCOUNTER — EMERGENCY (EMERGENCY)
Facility: HOSPITAL | Age: 61
LOS: 1 days | Discharge: HOME | End: 2021-01-24
Attending: EMERGENCY MEDICINE | Admitting: EMERGENCY MEDICINE
Payer: COMMERCIAL

## 2021-01-24 VITALS
TEMPERATURE: 98 F | HEART RATE: 90 BPM | OXYGEN SATURATION: 99 % | DIASTOLIC BLOOD PRESSURE: 60 MMHG | RESPIRATION RATE: 18 BRPM | SYSTOLIC BLOOD PRESSURE: 100 MMHG

## 2021-01-24 VITALS
HEART RATE: 86 BPM | SYSTOLIC BLOOD PRESSURE: 120 MMHG | WEIGHT: 110.01 LBS | OXYGEN SATURATION: 100 % | DIASTOLIC BLOOD PRESSURE: 76 MMHG | HEIGHT: 59 IN | RESPIRATION RATE: 16 BRPM | TEMPERATURE: 98 F

## 2021-01-24 DIAGNOSIS — Z88.2 ALLERGY STATUS TO SULFONAMIDES: ICD-10-CM

## 2021-01-24 DIAGNOSIS — R05 COUGH: ICD-10-CM

## 2021-01-24 DIAGNOSIS — R06.00 DYSPNEA, UNSPECIFIED: ICD-10-CM

## 2021-01-24 DIAGNOSIS — R53.1 WEAKNESS: ICD-10-CM

## 2021-01-24 DIAGNOSIS — Z20.822 CONTACT WITH AND (SUSPECTED) EXPOSURE TO COVID-19: ICD-10-CM

## 2021-01-24 DIAGNOSIS — R07.89 OTHER CHEST PAIN: ICD-10-CM

## 2021-01-24 LAB
ALBUMIN SERPL ELPH-MCNC: 4.6 G/DL — SIGNIFICANT CHANGE UP (ref 3.3–5)
ALP SERPL-CCNC: 101 U/L — SIGNIFICANT CHANGE UP (ref 40–120)
ALT FLD-CCNC: 14 U/L — SIGNIFICANT CHANGE UP (ref 10–45)
ANION GAP SERPL CALC-SCNC: 16 MMOL/L — SIGNIFICANT CHANGE UP (ref 5–17)
APPEARANCE UR: CLEAR — SIGNIFICANT CHANGE UP
AST SERPL-CCNC: 35 U/L — SIGNIFICANT CHANGE UP (ref 10–40)
BASOPHILS # BLD AUTO: 0.03 K/UL — SIGNIFICANT CHANGE UP (ref 0–0.2)
BASOPHILS NFR BLD AUTO: 0.7 % — SIGNIFICANT CHANGE UP (ref 0–2)
BILIRUB SERPL-MCNC: <0.2 MG/DL — SIGNIFICANT CHANGE UP (ref 0.2–1.2)
BILIRUB UR-MCNC: NEGATIVE — SIGNIFICANT CHANGE UP
BUN SERPL-MCNC: 3 MG/DL — LOW (ref 7–23)
CALCIUM SERPL-MCNC: 9.3 MG/DL — SIGNIFICANT CHANGE UP (ref 8.4–10.5)
CHLORIDE SERPL-SCNC: 104 MMOL/L — SIGNIFICANT CHANGE UP (ref 96–108)
CO2 SERPL-SCNC: 22 MMOL/L — SIGNIFICANT CHANGE UP (ref 22–31)
COLOR SPEC: YELLOW — SIGNIFICANT CHANGE UP
CREAT SERPL-MCNC: 0.42 MG/DL — LOW (ref 0.5–1.3)
DIFF PNL FLD: NEGATIVE — SIGNIFICANT CHANGE UP
EOSINOPHIL # BLD AUTO: 0.12 K/UL — SIGNIFICANT CHANGE UP (ref 0–0.5)
EOSINOPHIL NFR BLD AUTO: 2.8 % — SIGNIFICANT CHANGE UP (ref 0–6)
GLUCOSE SERPL-MCNC: 86 MG/DL — SIGNIFICANT CHANGE UP (ref 70–99)
GLUCOSE UR QL: NEGATIVE — SIGNIFICANT CHANGE UP
HCT VFR BLD CALC: 36.5 % — SIGNIFICANT CHANGE UP (ref 34.5–45)
HGB BLD-MCNC: 11.4 G/DL — LOW (ref 11.5–15.5)
IMM GRANULOCYTES NFR BLD AUTO: 0.2 % — SIGNIFICANT CHANGE UP (ref 0–1.5)
KETONES UR-MCNC: NEGATIVE — SIGNIFICANT CHANGE UP
LEUKOCYTE ESTERASE UR-ACNC: NEGATIVE — SIGNIFICANT CHANGE UP
LYMPHOCYTES # BLD AUTO: 1.55 K/UL — SIGNIFICANT CHANGE UP (ref 1–3.3)
LYMPHOCYTES # BLD AUTO: 36.4 % — SIGNIFICANT CHANGE UP (ref 13–44)
MCHC RBC-ENTMCNC: 25.7 PG — LOW (ref 27–34)
MCHC RBC-ENTMCNC: 31.2 GM/DL — LOW (ref 32–36)
MCV RBC AUTO: 82.2 FL — SIGNIFICANT CHANGE UP (ref 80–100)
MONOCYTES # BLD AUTO: 0.43 K/UL — SIGNIFICANT CHANGE UP (ref 0–0.9)
MONOCYTES NFR BLD AUTO: 10.1 % — SIGNIFICANT CHANGE UP (ref 2–14)
NEUTROPHILS # BLD AUTO: 2.12 K/UL — SIGNIFICANT CHANGE UP (ref 1.8–7.4)
NEUTROPHILS NFR BLD AUTO: 49.8 % — SIGNIFICANT CHANGE UP (ref 43–77)
NITRITE UR-MCNC: NEGATIVE — SIGNIFICANT CHANGE UP
NRBC # BLD: 0 /100 WBCS — SIGNIFICANT CHANGE UP (ref 0–0)
PH UR: 6 — SIGNIFICANT CHANGE UP (ref 5–8)
PLATELET # BLD AUTO: 219 K/UL — SIGNIFICANT CHANGE UP (ref 150–400)
POTASSIUM SERPL-MCNC: 4.1 MMOL/L — SIGNIFICANT CHANGE UP (ref 3.5–5.3)
POTASSIUM SERPL-SCNC: 4.1 MMOL/L — SIGNIFICANT CHANGE UP (ref 3.5–5.3)
PROT SERPL-MCNC: 8.9 G/DL — HIGH (ref 6–8.3)
PROT UR-MCNC: NEGATIVE MG/DL — SIGNIFICANT CHANGE UP
RBC # BLD: 4.44 M/UL — SIGNIFICANT CHANGE UP (ref 3.8–5.2)
RBC # FLD: 15.4 % — HIGH (ref 10.3–14.5)
SARS-COV-2 RNA SPEC QL NAA+PROBE: SIGNIFICANT CHANGE UP
SODIUM SERPL-SCNC: 142 MMOL/L — SIGNIFICANT CHANGE UP (ref 135–145)
SP GR SPEC: 1.01 — SIGNIFICANT CHANGE UP (ref 1–1.03)
TROPONIN T SERPL-MCNC: <0.01 NG/ML — SIGNIFICANT CHANGE UP (ref 0–0.01)
UROBILINOGEN FLD QL: 0.2 E.U./DL — SIGNIFICANT CHANGE UP
WBC # BLD: 4.26 K/UL — SIGNIFICANT CHANGE UP (ref 3.8–10.5)
WBC # FLD AUTO: 4.26 K/UL — SIGNIFICANT CHANGE UP (ref 3.8–10.5)

## 2021-01-24 PROCEDURE — 70450 CT HEAD/BRAIN W/O DYE: CPT | Mod: 26,MG

## 2021-01-24 PROCEDURE — 72125 CT NECK SPINE W/O DYE: CPT

## 2021-01-24 PROCEDURE — 70450 CT HEAD/BRAIN W/O DYE: CPT

## 2021-01-24 PROCEDURE — G1004: CPT

## 2021-01-24 PROCEDURE — 93005 ELECTROCARDIOGRAM TRACING: CPT

## 2021-01-24 PROCEDURE — 84484 ASSAY OF TROPONIN QUANT: CPT

## 2021-01-24 PROCEDURE — 81003 URINALYSIS AUTO W/O SCOPE: CPT

## 2021-01-24 PROCEDURE — U0005: CPT

## 2021-01-24 PROCEDURE — 72125 CT NECK SPINE W/O DYE: CPT | Mod: 26,MG

## 2021-01-24 PROCEDURE — 87086 URINE CULTURE/COLONY COUNT: CPT

## 2021-01-24 PROCEDURE — 99285 EMERGENCY DEPT VISIT HI MDM: CPT | Mod: 25

## 2021-01-24 PROCEDURE — 99284 EMERGENCY DEPT VISIT MOD MDM: CPT

## 2021-01-24 PROCEDURE — 71045 X-RAY EXAM CHEST 1 VIEW: CPT

## 2021-01-24 PROCEDURE — 93010 ELECTROCARDIOGRAM REPORT: CPT

## 2021-01-24 PROCEDURE — 71045 X-RAY EXAM CHEST 1 VIEW: CPT | Mod: 26

## 2021-01-24 PROCEDURE — U0003: CPT

## 2021-01-24 PROCEDURE — 85025 COMPLETE CBC W/AUTO DIFF WBC: CPT

## 2021-01-24 PROCEDURE — 36415 COLL VENOUS BLD VENIPUNCTURE: CPT

## 2021-01-24 PROCEDURE — 80053 COMPREHEN METABOLIC PANEL: CPT

## 2021-01-24 RX ORDER — ACETAMINOPHEN 500 MG
650 TABLET ORAL ONCE
Refills: 0 | Status: COMPLETED | OUTPATIENT
Start: 2021-01-24 | End: 2021-01-24

## 2021-01-24 RX ADMIN — Medication 650 MILLIGRAM(S): at 05:06
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
## 2021-01-24 NOTE — ED PROVIDER NOTE - WR INTERPRETATION 1
CXR negative - No infiltrates, No consolidation, No atelectasis seenCXR negative - No pneumothorax, No opacities, No free air

## 2021-01-24 NOTE — ED PROVIDER NOTE - PATIENT PORTAL LINK FT
You can access the FollowMyHealth Patient Portal offered by [facility] by registering at the following website: [email or web address]. By joining [employer]s FollowMyHealth portal, you will also be able to view your health information using other applications (apps) compatible with our system.

## 2021-01-24 NOTE — ED PROVIDER NOTE - NS ED ROS FT
General: no fever, chills, confusion  Cardiac: REPORTS CHEST DISCOMFORT, denies chest tightness or palpitations  Lungs: REPORTS COUGH, no sob, difficulty breathing  Abdomen: no abdominal pain, nausea, vomiting, diarrhea, constipation, nml BM  : no dysuria, urinary frequency/urgency  Neuro: denies LOC, HA, dizziness, confusion  MSK: denies neck/back pain    All other systems negative except as per HPI

## 2021-01-24 NOTE — ED PROVIDER NOTE - CLINICAL SUMMARY MEDICAL DECISION MAKING FREE TEXT BOX
Patient is presenting to the Emergency Department c/o weakness, uti?, cough? and b/l upper shoulder pain that has resolved prior to ED arrival from [place]  Patient has an unremarkable focused exam and looks well.  Nasopharyngeal PCR has been obtained and patient has been guided on how to obtain their results.  General COVID-19 discharge instructions have been given to the patient. Labs and VS wnml. EKG nsr. CT head and CT cervical negative. CXR negative.
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
## 2021-01-24 NOTE — ED PROVIDER NOTE - PHYSICAL EXAMINATION
CONSTITUTIONAL: Unkempt and in NAD   SKIN: Warm and dry, no acute rash.  HEAD: Normocephalic; atraumatic.  EYES: PERRL, EOM intact; conjunctiva and sclera clear.  ENT: No nasal discharge; airway clear.  NECK: Supple; non tender.  CARD: S1, S2 normal; no murmurs, gallops, or rubs. Regular rate and rhythm.  RESP: No wheezes, rales or rhonchi.  ABD: Normal bowel sounds; soft; non-distended; non-tender; no hepatosplenomegaly.  EXT: Normal ROM. No clubbing, cyanosis or edema.  LYMPH: No acute cervical adenopathy.  NEURO: Alert, oriented. Grossly unremarkable.  PSYCH: Cooperative, appropriate.  MSK: normal msk x4, no spinal ttp, no step offs.

## 2021-01-25 LAB
CULTURE RESULTS: SIGNIFICANT CHANGE UP
SPECIMEN SOURCE: SIGNIFICANT CHANGE UP

## 2021-04-04 ENCOUNTER — EMERGENCY (EMERGENCY)
Facility: HOSPITAL | Age: 61
LOS: 1 days | Discharge: HOME | End: 2021-04-04
Attending: EMERGENCY MEDICINE | Admitting: EMERGENCY MEDICINE
Payer: MEDICAID

## 2021-04-04 VITALS
SYSTOLIC BLOOD PRESSURE: 149 MMHG | TEMPERATURE: 98 F | RESPIRATION RATE: 18 BRPM | OXYGEN SATURATION: 96 % | WEIGHT: 119.93 LBS | HEART RATE: 80 BPM | DIASTOLIC BLOOD PRESSURE: 87 MMHG | HEIGHT: 59 IN

## 2021-04-04 DIAGNOSIS — F10.120 ALCOHOL ABUSE WITH INTOXICATION, UNCOMPLICATED: ICD-10-CM

## 2021-04-04 DIAGNOSIS — Z88.5 ALLERGY STATUS TO NARCOTIC AGENT: ICD-10-CM

## 2021-04-04 DIAGNOSIS — Z20.822 CONTACT WITH AND (SUSPECTED) EXPOSURE TO COVID-19: ICD-10-CM

## 2021-04-04 DIAGNOSIS — R41.82 ALTERED MENTAL STATUS, UNSPECIFIED: ICD-10-CM

## 2021-04-04 LAB
ALBUMIN SERPL ELPH-MCNC: 4.2 G/DL — SIGNIFICANT CHANGE UP (ref 3.4–5)
ALP SERPL-CCNC: 73 U/L — SIGNIFICANT CHANGE UP (ref 40–120)
ALT FLD-CCNC: 49 U/L — HIGH (ref 12–42)
AMPHET UR-MCNC: NEGATIVE — SIGNIFICANT CHANGE UP
ANION GAP SERPL CALC-SCNC: 13 MMOL/L — SIGNIFICANT CHANGE UP (ref 9–16)
APAP SERPL-MCNC: <2 UG/ML — LOW (ref 10–30)
APPEARANCE UR: CLEAR — SIGNIFICANT CHANGE UP
AST SERPL-CCNC: 91 U/L — HIGH (ref 15–37)
BACTERIA # UR AUTO: PRESENT /HPF
BARBITURATES UR SCN-MCNC: NEGATIVE — SIGNIFICANT CHANGE UP
BASOPHILS # BLD AUTO: 0.03 K/UL — SIGNIFICANT CHANGE UP (ref 0–0.2)
BASOPHILS NFR BLD AUTO: 0.8 % — SIGNIFICANT CHANGE UP (ref 0–2)
BENZODIAZ UR-MCNC: NEGATIVE — SIGNIFICANT CHANGE UP
BILIRUB SERPL-MCNC: 0.4 MG/DL — SIGNIFICANT CHANGE UP (ref 0.2–1.2)
BILIRUB UR-MCNC: NEGATIVE — SIGNIFICANT CHANGE UP
BUN SERPL-MCNC: <3 MG/DL — LOW (ref 7–23)
CALCIUM SERPL-MCNC: 8.8 MG/DL — SIGNIFICANT CHANGE UP (ref 8.5–10.5)
CHLORIDE SERPL-SCNC: 95 MMOL/L — LOW (ref 96–108)
CO2 SERPL-SCNC: 23 MMOL/L — SIGNIFICANT CHANGE UP (ref 22–31)
COCAINE METAB.OTHER UR-MCNC: NEGATIVE — SIGNIFICANT CHANGE UP
COLOR SPEC: YELLOW — SIGNIFICANT CHANGE UP
CREAT SERPL-MCNC: 0.56 MG/DL — SIGNIFICANT CHANGE UP (ref 0.5–1.3)
DIFF PNL FLD: NEGATIVE — SIGNIFICANT CHANGE UP
EOSINOPHIL # BLD AUTO: 0.04 K/UL — SIGNIFICANT CHANGE UP (ref 0–0.5)
EOSINOPHIL NFR BLD AUTO: 1.1 % — SIGNIFICANT CHANGE UP (ref 0–6)
EPI CELLS # UR: ABNORMAL /HPF (ref 0–5)
ETHANOL SERPL-MCNC: 396 MG/DL — HIGH
GLUCOSE SERPL-MCNC: 84 MG/DL — SIGNIFICANT CHANGE UP (ref 70–99)
GLUCOSE UR QL: NEGATIVE — SIGNIFICANT CHANGE UP
HCG SERPL-ACNC: 2 MIU/ML — SIGNIFICANT CHANGE UP
HCG UR QL: NEGATIVE — SIGNIFICANT CHANGE UP
HCT VFR BLD CALC: 31.4 % — LOW (ref 34.5–45)
HGB BLD-MCNC: 10.5 G/DL — LOW (ref 11.5–15.5)
IMM GRANULOCYTES NFR BLD AUTO: 0.3 % — SIGNIFICANT CHANGE UP (ref 0–1.5)
KETONES UR-MCNC: NEGATIVE — SIGNIFICANT CHANGE UP
LEUKOCYTE ESTERASE UR-ACNC: ABNORMAL
LIDOCAIN IGE QN: 80 U/L — SIGNIFICANT CHANGE UP (ref 73–393)
LYMPHOCYTES # BLD AUTO: 2.13 K/UL — SIGNIFICANT CHANGE UP (ref 1–3.3)
LYMPHOCYTES # BLD AUTO: 57.4 % — HIGH (ref 13–44)
MAGNESIUM SERPL-MCNC: 1.5 MG/DL — LOW (ref 1.6–2.6)
MCHC RBC-ENTMCNC: 26.1 PG — LOW (ref 27–34)
MCHC RBC-ENTMCNC: 33.4 GM/DL — SIGNIFICANT CHANGE UP (ref 32–36)
MCV RBC AUTO: 77.9 FL — LOW (ref 80–100)
METHADONE UR-MCNC: NEGATIVE — SIGNIFICANT CHANGE UP
MONOCYTES # BLD AUTO: 0.27 K/UL — SIGNIFICANT CHANGE UP (ref 0–0.9)
MONOCYTES NFR BLD AUTO: 7.3 % — SIGNIFICANT CHANGE UP (ref 2–14)
NEUTROPHILS # BLD AUTO: 1.23 K/UL — LOW (ref 1.8–7.4)
NEUTROPHILS NFR BLD AUTO: 33.1 % — LOW (ref 43–77)
NITRITE UR-MCNC: NEGATIVE — SIGNIFICANT CHANGE UP
NRBC # BLD: 0 /100 WBCS — SIGNIFICANT CHANGE UP (ref 0–0)
OPIATES UR-MCNC: NEGATIVE — SIGNIFICANT CHANGE UP
PCP SPEC-MCNC: SIGNIFICANT CHANGE UP
PCP UR-MCNC: NEGATIVE — SIGNIFICANT CHANGE UP
PH UR: 5.5 — SIGNIFICANT CHANGE UP (ref 5–8)
PLATELET # BLD AUTO: 119 K/UL — LOW (ref 150–400)
POTASSIUM SERPL-MCNC: 3.3 MMOL/L — LOW (ref 3.5–5.3)
POTASSIUM SERPL-SCNC: 3.3 MMOL/L — LOW (ref 3.5–5.3)
PROT SERPL-MCNC: 8.5 G/DL — HIGH (ref 6.4–8.2)
PROT UR-MCNC: NEGATIVE MG/DL — SIGNIFICANT CHANGE UP
RBC # BLD: 4.03 M/UL — SIGNIFICANT CHANGE UP (ref 3.8–5.2)
RBC # FLD: 17.1 % — HIGH (ref 10.3–14.5)
RBC CASTS # UR COMP ASSIST: < 5 /HPF — SIGNIFICANT CHANGE UP
SALICYLATES SERPL-MCNC: 1.1 MG/DL — LOW (ref 2.8–20)
SARS-COV-2 RNA SPEC QL NAA+PROBE: SIGNIFICANT CHANGE UP
SODIUM SERPL-SCNC: 131 MMOL/L — LOW (ref 132–145)
SP GR SPEC: <=1.005 — SIGNIFICANT CHANGE UP (ref 1–1.03)
THC UR QL: NEGATIVE — SIGNIFICANT CHANGE UP
TSH SERPL-MCNC: 2.8 UIU/ML — SIGNIFICANT CHANGE UP (ref 0.36–3.74)
UROBILINOGEN FLD QL: 0.2 E.U./DL — SIGNIFICANT CHANGE UP
WBC # BLD: 3.71 K/UL — LOW (ref 3.8–10.5)
WBC # FLD AUTO: 3.71 K/UL — LOW (ref 3.8–10.5)
WBC UR QL: < 5 /HPF — SIGNIFICANT CHANGE UP

## 2021-04-04 PROCEDURE — 99284 EMERGENCY DEPT VISIT MOD MDM: CPT

## 2021-04-04 PROCEDURE — 93010 ELECTROCARDIOGRAM REPORT: CPT

## 2021-04-04 RX ORDER — MAGNESIUM OXIDE 400 MG ORAL TABLET 241.3 MG
400 TABLET ORAL ONCE
Refills: 0 | Status: COMPLETED | OUTPATIENT
Start: 2021-04-04 | End: 2021-04-04

## 2021-04-04 RX ORDER — POTASSIUM CHLORIDE 20 MEQ
40 PACKET (EA) ORAL ONCE
Refills: 0 | Status: COMPLETED | OUTPATIENT
Start: 2021-04-04 | End: 2021-04-04

## 2021-04-04 RX ADMIN — Medication 40 MILLIEQUIVALENT(S): at 23:57

## 2021-04-04 RX ADMIN — MAGNESIUM OXIDE 400 MG ORAL TABLET 400 MILLIGRAM(S): 241.3 TABLET ORAL at 23:58

## 2021-04-04 NOTE — ED ADULT NURSE NOTE - OBJECTIVE STATEMENT
59 yo F BIBA for AMS. Pt admits to drinking ETOH tonight, pt denies all medical complaints. Denies fall, injury. No obvious injuries noted to person. Pt speaking in full complete sentences, ambulatory with steady gait.

## 2021-04-04 NOTE — ED PROVIDER NOTE - PROGRESS NOTE DETAILS
PT INTOXICATED AND LABS SHOWING SIGNS OF DEHYDRATION. ALSO REQUIRED K AND MAG REPLACEMENT. WILL PLACE ON OBS FOR NEURO CHECKS AND TO LET METABOLIZE ALCOHOL. ALSO TO REPEAT LABS ONCE HYDRATED AND REPLACED.

## 2021-04-04 NOTE — ED PROVIDER NOTE - CLINICAL SUMMARY MEDICAL DECISION MAKING FREE TEXT BOX
Pt presents intoxicated, admits to drinking alcohol and tangential about symptoms otherwise. Will get basic labs including BAL and will observe for clinical sobriety. At this time has not expressed SI/HI or hallucinations but seems tearful at times.
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
## 2021-04-05 VITALS
DIASTOLIC BLOOD PRESSURE: 66 MMHG | SYSTOLIC BLOOD PRESSURE: 107 MMHG | HEART RATE: 81 BPM | OXYGEN SATURATION: 98 % | TEMPERATURE: 98 F | RESPIRATION RATE: 16 BRPM

## 2021-04-05 LAB
ANION GAP SERPL CALC-SCNC: 10 MMOL/L — SIGNIFICANT CHANGE UP (ref 9–16)
BUN SERPL-MCNC: <3 MG/DL — LOW (ref 7–23)
CALCIUM SERPL-MCNC: 8.3 MG/DL — LOW (ref 8.5–10.5)
CHLORIDE SERPL-SCNC: 103 MMOL/L — SIGNIFICANT CHANGE UP (ref 96–108)
CO2 SERPL-SCNC: 24 MMOL/L — SIGNIFICANT CHANGE UP (ref 22–31)
CREAT SERPL-MCNC: 0.49 MG/DL — LOW (ref 0.5–1.3)
GLUCOSE BLDC GLUCOMTR-MCNC: 69 MG/DL — LOW (ref 70–99)
GLUCOSE BLDC GLUCOMTR-MCNC: 94 MG/DL — SIGNIFICANT CHANGE UP (ref 70–99)
GLUCOSE SERPL-MCNC: 68 MG/DL — LOW (ref 70–99)
MAGNESIUM SERPL-MCNC: 1.5 MG/DL — LOW (ref 1.6–2.6)
POTASSIUM SERPL-MCNC: 4.7 MMOL/L — SIGNIFICANT CHANGE UP (ref 3.5–5.3)
POTASSIUM SERPL-SCNC: 4.7 MMOL/L — SIGNIFICANT CHANGE UP (ref 3.5–5.3)
SODIUM SERPL-SCNC: 137 MMOL/L — SIGNIFICANT CHANGE UP (ref 132–145)

## 2021-04-05 RX ORDER — SODIUM CHLORIDE 9 MG/ML
1000 INJECTION INTRAMUSCULAR; INTRAVENOUS; SUBCUTANEOUS ONCE
Refills: 0 | Status: COMPLETED | OUTPATIENT
Start: 2021-04-05 | End: 2021-04-05

## 2021-04-05 RX ORDER — ONDANSETRON 8 MG/1
4 TABLET, FILM COATED ORAL ONCE
Refills: 0 | Status: COMPLETED | OUTPATIENT
Start: 2021-04-05 | End: 2021-04-05

## 2021-04-05 RX ORDER — MAGNESIUM SULFATE 500 MG/ML
1 VIAL (ML) INJECTION ONCE
Refills: 0 | Status: COMPLETED | OUTPATIENT
Start: 2021-04-05 | End: 2021-04-05

## 2021-04-05 RX ADMIN — Medication 100 GRAM(S): at 03:05

## 2021-04-05 RX ADMIN — SODIUM CHLORIDE 1000 MILLILITER(S): 9 INJECTION INTRAMUSCULAR; INTRAVENOUS; SUBCUTANEOUS at 00:59

## 2021-04-05 RX ADMIN — ONDANSETRON 4 MILLIGRAM(S): 8 TABLET, FILM COATED ORAL at 04:39

## 2021-04-05 NOTE — ED CDU PROVIDER DISPOSITION NOTE - PATIENT PORTAL LINK FT
You can access the FollowMyHealth Patient Portal offered by [facility] by registering at the following website: [email or web address]. By joining [employer]’s FollowMyHealth portal, you will also be able to view your health information using other applications (apps) compatible with our system.

## 2021-04-05 NOTE — ED CDU PROVIDER DISPOSITION NOTE - CLINICAL COURSE
pt now alert, walking without assistance tolerated po/. Will DC pt now alert, walking without assistance tolerated po/. Will DC.

## 2021-04-05 NOTE — ED ADULT NURSE REASSESSMENT NOTE - NS ED NURSE REASSESS COMMENT FT1
Pt received from RN Charity. Pt asleep in stretcher, no indicators of pain present. Breathing spontaneous and unlabored. Fluids infusing, peripheral IV patent and intact.

## 2021-04-05 NOTE — ED ADULT NURSE REASSESSMENT NOTE - NS ED NURSE REASSESS COMMENT FT1
Pt received from TIEN Bowers. Pt sleeping comfortably in bed. No s/s of acute distress. Will continue to monitor.

## 2021-10-21 NOTE — DISCHARGE NOTE ADULT - ADDITIONAL INSTRUCTIONS
Controlled with current regime Follow up appointment is on 1/29 at [facility] ([address]) at 11am

## 2022-05-10 NOTE — PATIENT PROFILE ADULT. - NS PRO AD NO ADVANCE DIRECTIVE
Hospitalist Progress Note      PCP: Mathew Cole MD    Date of Admission: 5/9/2022    Chief Complaint: freq fall, generalized weakness    Hospital Course: The patient is a pleasant 70 Y M with a h/o former smoking, CAD, PAF, parkinson disease, and NSCLC s/p RU lobectomy in 12/2021 and completion of chemotherapy 10 days ago. He and his wife say that in the first 4 months after his lung resection he unintentionally lost about 50 lbs. Since losing all of this weight he has been falling often. He was admitted to [facility] in 3/2022 due to falls. An ulnar fracture was treated conservatively. His MIRNA resolved with IVFs. He was discharged home with \"PRN assistance\" per PT/OT recommendations. Since then the patient has continued to fall. He estimates he has fallen about 7 times since that discharge 6 weeks ago. Sometimes he just loses his balance, sometimes he trips on the vacuum robot, sometimes the wind just blows him over. The only consistent theme between all of these falls is that they always seem to happen within a few seconds of the patient standing up. He denies any lightheadedness or dizziness, but after standing up \"my legs just go to jello after a few steps and I fall down. \"  In the ED he was severely orthostatic: BP dropped from 121/64 to 71/47. He actually did quite well with PT, walked 150 feet with contact-guard assistance, and they only recommended outpatient PT. He and his wife insisted that he wasn't safe to go home like this, since they were sure he would seriously injure himself before long. It has gotten to the point that the patient is afraid to get up out of his chair \"because I know I'll just fall over. \"  His wife worries his muscles are going to wast away due to inactivity. The patient doesn't have any new, acute symptoms and doesn't feel particularly ill. He and his wife agree that he has been eating well more recently, and intentionally drinking lots of fluids.   His labs and imaging were fairly unremarkable. Subjective: No new complaints, updated on plan of care      Medications:  Reviewed    Infusion Medications    sodium chloride 100 mL/hr at 05/10/22 0837    sodium chloride       Scheduled Medications    atorvastatin  40 mg Oral Daily    carbidopa-levodopa  1 tablet Oral Daily    ferrous sulfate  325 mg Oral BID WC    finasteride  5 mg Oral Daily    lacosamide  100 mg Oral BID    LORazepam  1 mg Oral Daily    LORazepam  2 mg Oral Nightly    [Held by provider] losartan  25 mg Oral Daily    pantoprazole  20 mg Oral BID AC    PARoxetine  20 mg Oral BID    [Held by provider] sotalol  40 mg Oral BID    zolpidem  10 mg Oral Nightly    sodium chloride flush  5-40 mL IntraVENous 2 times per day    enoxaparin  40 mg SubCUTAneous Daily     PRN Meds: perflutren lipid microspheres, sodium chloride flush, sodium chloride, polyethylene glycol, acetaminophen **OR** acetaminophen, prochlorperazine      Intake/Output Summary (Last 24 hours) at 5/10/2022 1451  Last data filed at 5/10/2022 0630  Gross per 24 hour   Intake 1000 ml   Output 475 ml   Net 525 ml       Physical Exam Performed:    /79   Pulse 70   Temp 97.4 °F (36.3 °C) (Oral)   Resp 18   Ht 5' 10.5\" (1.791 m)   Wt 186 lb (84.4 kg)   SpO2 97%   BMI 26.31 kg/m²     General appearance: No apparent distress, appears stated age and cooperative. HEENT: Pupils equal, round, and reactive to light. Conjunctivae/corneas clear. Neck: Supple, with full range of motion. No jugular venous distention. Trachea midline. Respiratory:  Normal respiratory effort. Clear to auscultation, bilaterally without Rales/Wheezes/Rhonchi. Cardiovascular: Regular rate and rhythm with normal S1/S2 without murmurs, rubs or gallops. Abdomen: Soft, non-tender, non-distended with normal bowel sounds. Musculoskeletal: No clubbing, cyanosis or edema bilaterally. Full range of motion without deformity.   Skin: Skin color, texture, turgor normal.  No rashes or lesions. Neurologic:  Neurovascularly intact without any focal sensory/motor deficits. Cranial nerves: II-XII intact, grossly non-focal.  Psychiatric: Alert and oriented, thought content appropriate, normal insight  Capillary Refill: Brisk,3 seconds, normal   Peripheral Pulses: +2 palpable, equal bilaterally       Labs:   Recent Labs     05/09/22  1232 05/10/22  0631   WBC 4.2 3.3*   HGB 9.0* 8.2*   HCT 26.4* 24.0*   * 76*     Recent Labs     05/09/22  1232 05/10/22  0631    142   K 3.7 3.8    107   CO2 26 26   BUN 15 13   CREATININE 0.7* 0.7*   CALCIUM 9.1 8.8     Recent Labs     05/09/22  1232   AST 22   ALT 14   BILITOT 0.6   ALKPHOS 103     Recent Labs     05/09/22  1232   INR 1.45*     Recent Labs     05/09/22  1232   TROPONINI <0.01       Urinalysis:      Lab Results   Component Value Date    NITRU Negative 05/09/2022    WBCUA 0-2 09/08/2014    BACTERIA 1+ 05/31/2012    RBCUA 0-2 09/08/2014    BLOODU Negative 05/09/2022    SPECGRAV 1.025 05/09/2022    GLUCOSEU 100 05/09/2022    GLUCOSEU NEGATIVE 05/31/2012       Radiology:  XR CHEST (2 VW)   Final Result   Small right pleural effusion. CT HEAD WO CONTRAST   Final Result   Fluid collections described in few left mastoid sinus cells are likely   incidental sterile fluid collections. No acute intracranial abnormality. CT CERVICAL SPINE WO CONTRAST   Final Result   No acute abnormality of the cervical spine. Stable postoperative changes at C5 through C7. Multilevel cervical   spondylosis and facet arthropathy also stable. XR HAND LEFT (MIN 3 VIEWS)   Final Result   Degenerative changes. Metallic foreign body. Chondrocalcinosis. 1 mm of ulna plus variance. No acute process. Sign report         XR KNEE RIGHT (1-2 VIEWS)   Final Result   Well-seated TKA. Regional osteoporosis. No evidence of an acute process.          XR HIP 2-3 VW W PELVIS RIGHT   Final Result   No acute findings. Degenerative changes as described above. Assessment/Plan:    Active Hospital Problems    Diagnosis     Orthostasis [I95.1]      Priority: Medium    Frequent falls [R29.6]      Falls. Likely due to combination of muscular deconditioning and orthostasis. - nutrition supplements. PT/OT. - stand up slowly, stay hydrated  - we discussed many of his medications which might be contributing to his falls:              - he no longer takes his PRN furosemide.                - held losartan, might have to tolerate higher supine BP's. Trend orthostats daily. - stopped ISMN. His coronary stent was 10 years ago and he hasn't had angina in a long time. - the patient feels strongly about continuing his high dose zolpidem and lorazepam.              - supposedly he was diagnosed with parkinson's disease by a neurologist 5 years ago and that neurologist started him on sinemet. The neurologist moved out of state and his PCP has been refilling this med ever since. The patient says that he had a bad tremor before he started sinemet, and he doesn't want to stop or try cutting back on this med.     PAF. held sotalol. Stopped dabigatran. He has hit his head hard twice in the last few weeks alone. Reassess in the future if he stops falling. F/u with cardiology for consideration of watchman device.       Obstructive uropathy. Finasteride.     HLD. Statin. DVT Prophylaxis: Scds  Diet: ADULT DIET;  Regular  ADULT ORAL NUTRITION SUPPLEMENT; Breakfast, Lunch, Dinner; Standard High Calorie/High Protein Oral Supplement  Code Status: Full Code    PT/OT Eval Status: consulted    Dispo - 1-2 days pending clinical improvement    KATIE Freitas - CNP No
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
## 2022-08-01 NOTE — ED PROVIDER NOTE - CONSTITUTIONAL APPEARANCE HYGIENE, MLM
POOR HYGIENE/UNKEMPT Patient is a 81y old  Male who presents with a chief complaint of failure to thrive (01 Aug 2022 10:21)      DATE OF SERVICE: 08-01-22 @ 14:36    SUBJECTIVE / OVERNIGHT EVENTS: overnight events noted    ROS:  Resp: No cough no sputum production  CVS: No chest pain no palpitations no orthopnea  GI: no N/V/D          MEDICATIONS  (STANDING):  atorvastatin 80 milliGRAM(s) Oral at bedtime  cyanocobalamin 1000 MICROGram(s) Oral daily  dextrose 5%. 1000 milliLiter(s) (50 mL/Hr) IV Continuous <Continuous>  dextrose 5%. 1000 milliLiter(s) (100 mL/Hr) IV Continuous <Continuous>  dextrose 50% Injectable 25 Gram(s) IV Push once  dextrose 50% Injectable 12.5 Gram(s) IV Push once  dextrose 50% Injectable 25 Gram(s) IV Push once  glucagon  Injectable 1 milliGRAM(s) IntraMuscular once  heparin   Injectable 5000 Unit(s) SubCutaneous every 12 hours  insulin lispro (ADMELOG) corrective regimen sliding scale   SubCutaneous three times a day before meals  mirtazapine 7.5 milliGRAM(s) Oral at bedtime  potassium chloride    Tablet ER 40 milliEquivalent(s) Oral every 4 hours  sodium chloride 0.9% with potassium chloride 20 mEq/L 1000 milliLiter(s) (50 mL/Hr) IV Continuous <Continuous>  tamsulosin 0.8 milliGRAM(s) Oral at bedtime    MEDICATIONS  (PRN):  acetaminophen     Tablet .. 650 milliGRAM(s) Oral every 6 hours PRN Temp greater or equal to 38C (100.4F), Mild Pain (1 - 3)  dextrose Oral Gel 15 Gram(s) Oral once PRN Blood Glucose LESS THAN 70 milliGRAM(s)/deciliter        CAPILLARY BLOOD GLUCOSE      POCT Blood Glucose.: 152 mg/dL (01 Aug 2022 12:16)  POCT Blood Glucose.: 156 mg/dL (01 Aug 2022 07:50)  POCT Blood Glucose.: 183 mg/dL (31 Jul 2022 21:24)  POCT Blood Glucose.: 205 mg/dL (31 Jul 2022 16:41)    I&O's Summary    31 Jul 2022 07:01  -  01 Aug 2022 07:00  --------------------------------------------------------  IN: 200 mL / OUT: 0 mL / NET: 200 mL        Vital Signs Last 24 Hrs  T(C): 36.7 (01 Aug 2022 09:48), Max: 37.2 (31 Jul 2022 16:38)  T(F): 98.1 (01 Aug 2022 09:48), Max: 98.9 (31 Jul 2022 16:38)  HR: 92 (01 Aug 2022 12:20) (75 - 94)  BP: 116/53 (01 Aug 2022 12:20) (85/44 - 123/60)  BP(mean): --  RR: 18 (01 Aug 2022 09:48) (18 - 18)  SpO2: 95% (01 Aug 2022 09:48) (95% - 96%)    PHYSICAL EXAM:  EYES: EOMI, PERRLA  NECK: Supple, No JVD  CHEST/LUNG: clear  HEART: S1 S2; no murmurs   ABDOMEN: Soft, Nontender  ventral swelling no change  EXTREMITIES:   no edema  NEUROLOGY: alert non-focal  SKIN: No rashes or lesions    LABS:                        12.5   26.55 )-----------( 193      ( 01 Aug 2022 07:42 )             38.5     08-01    130<L>  |  92<L>  |  22  ----------------------------<  131<H>  4.1   |  26  |  1.05    Ca    8.4      01 Aug 2022 07:42    TPro  6.1  /  Alb  2.0<L>  /  TBili  0.9  /  DBili  x   /  AST  41<H>  /  ALT  15  /  AlkPhos  221<H>  07-31    PT/INR - ( 01 Aug 2022 07:42 )   PT: 13.7 sec;   INR: 1.18 ratio         PTT - ( 01 Aug 2022 07:42 )  PTT:28.3 sec  CARDIAC MARKERS ( 30 Jul 2022 22:04 )  x     / x     / 19 U/L / x     / 1.0 ng/mL            All consultant(s) notes reviewed and care discussed with other providers        Contact Number, Dr Douglas [phone]

## 2022-12-19 ENCOUNTER — EMERGENCY (EMERGENCY)
Facility: HOSPITAL | Age: 62
LOS: 1 days | Discharge: HOME | End: 2022-12-19
Admitting: EMERGENCY MEDICINE

## 2022-12-19 VITALS
SYSTOLIC BLOOD PRESSURE: 116 MMHG | WEIGHT: 98.11 LBS | HEIGHT: 59 IN | HEART RATE: 100 BPM | TEMPERATURE: 98 F | RESPIRATION RATE: 18 BRPM | DIASTOLIC BLOOD PRESSURE: 73 MMHG | OXYGEN SATURATION: 98 %

## 2022-12-19 DIAGNOSIS — Z86.59 PERSONAL HISTORY OF OTHER MENTAL AND BEHAVIORAL DISORDERS: ICD-10-CM

## 2022-12-19 DIAGNOSIS — J45.909 UNSPECIFIED ASTHMA, UNCOMPLICATED: ICD-10-CM

## 2022-12-19 DIAGNOSIS — M25.552 PAIN IN LEFT HIP: ICD-10-CM

## 2022-12-19 DIAGNOSIS — Z98.890 OTHER SPECIFIED POSTPROCEDURAL STATES: ICD-10-CM

## 2022-12-19 DIAGNOSIS — Z87.09 PERSONAL HISTORY OF OTHER DISEASES OF THE RESPIRATORY SYSTEM: ICD-10-CM

## 2022-12-19 DIAGNOSIS — G89.29 OTHER CHRONIC PAIN: ICD-10-CM

## 2022-12-19 DIAGNOSIS — Z88.5 ALLERGY STATUS TO NARCOTIC AGENT: ICD-10-CM

## 2022-12-19 DIAGNOSIS — Z59.00 HOMELESSNESS UNSPECIFIED: ICD-10-CM

## 2022-12-19 PROCEDURE — 99284 EMERGENCY DEPT VISIT MOD MDM: CPT | Mod: 25

## 2022-12-19 PROCEDURE — 73502 X-RAY EXAM HIP UNI 2-3 VIEWS: CPT | Mod: 26,LT

## 2022-12-19 SDOH — ECONOMIC STABILITY - HOUSING INSECURITY: HOMELESSNESS UNSPECIFIED: Z59.00

## 2022-12-19 NOTE — ED ADULT TRIAGE NOTE - CHIEF COMPLAINT QUOTE
BIBA c/o back pain radiating down legs and up to neck. denies new injury/trauma. states she had surgery to left hip "last month" but denies hip pain. Statement Selected

## 2022-12-19 NOTE — ED ADULT NURSE NOTE - CHIEF COMPLAINT QUOTE
BIBA c/o back pain radiating down legs and up to neck. denies new injury/trauma. states she had surgery to left hip "last month" but denies hip pain.

## 2022-12-19 NOTE — ED ADULT NURSE NOTE - OBJECTIVE STATEMENT
Pt c/o L hip pain radiating to her left leg and back. Pt reports that she had a metal plate placed in November and that she lives in a shelter and has been sleeping in a chair. No obvious deformities noted.

## 2022-12-19 NOTE — ED ADULT NURSE NOTE - NSIMPLEMENTINTERV_GEN_ALL_ED
Implemented All Universal Safety Interventions:  [place] to call system. Call bell, personal items and telephone within reach. Instruct patient to call for assistance. Room bathroom lighting operational. Non-slip footwear when patient is off stretcher. Physically safe environment: no spills, clutter or unnecessary equipment. Stretcher in lowest position, wheels locked, appropriate side rails in place.

## 2022-12-19 NOTE — ED ADULT NURSE NOTE - CAS TRG GEN SKIN COLOR
Attempted to call mom POA but mailbox is full and no answer. Will endorse to day team.   Normal for race

## 2022-12-20 VITALS
OXYGEN SATURATION: 96 % | SYSTOLIC BLOOD PRESSURE: 108 MMHG | DIASTOLIC BLOOD PRESSURE: 78 MMHG | HEART RATE: 90 BPM | RESPIRATION RATE: 17 BRPM | TEMPERATURE: 98 F

## 2022-12-20 LAB
APPEARANCE UR: CLEAR — SIGNIFICANT CHANGE UP
BILIRUB UR-MCNC: NEGATIVE — SIGNIFICANT CHANGE UP
COLOR SPEC: YELLOW — SIGNIFICANT CHANGE UP
DIFF PNL FLD: NEGATIVE — SIGNIFICANT CHANGE UP
GLUCOSE UR QL: NEGATIVE — SIGNIFICANT CHANGE UP
KETONES UR-MCNC: NEGATIVE — SIGNIFICANT CHANGE UP
LEUKOCYTE ESTERASE UR-ACNC: NEGATIVE — SIGNIFICANT CHANGE UP
NITRITE UR-MCNC: NEGATIVE — SIGNIFICANT CHANGE UP
PH UR: 6 — SIGNIFICANT CHANGE UP (ref 5–8)
PROT UR-MCNC: NEGATIVE MG/DL — SIGNIFICANT CHANGE UP
SP GR SPEC: <=1.005 — SIGNIFICANT CHANGE UP (ref 1–1.03)
UROBILINOGEN FLD QL: 0.2 E.U./DL — SIGNIFICANT CHANGE UP

## 2022-12-20 PROCEDURE — 73502 X-RAY EXAM HIP UNI 2-3 VIEWS: CPT | Mod: 26,LT

## 2022-12-20 RX ORDER — TRAMADOL HYDROCHLORIDE 50 MG/1
25 TABLET ORAL ONCE
Refills: 0 | Status: DISCONTINUED | OUTPATIENT
Start: 2022-12-20 | End: 2022-12-20

## 2022-12-20 RX ORDER — LIDOCAINE 4 G/100G
1 CREAM TOPICAL ONCE
Refills: 0 | Status: COMPLETED | OUTPATIENT
Start: 2022-12-20 | End: 2022-12-20

## 2022-12-20 RX ORDER — ACETAMINOPHEN 500 MG
650 TABLET ORAL ONCE
Refills: 0 | Status: COMPLETED | OUTPATIENT
Start: 2022-12-20 | End: 2022-12-20

## 2022-12-20 RX ADMIN — Medication 650 MILLIGRAM(S): at 01:23

## 2022-12-20 RX ADMIN — TRAMADOL HYDROCHLORIDE 25 MILLIGRAM(S): 50 TABLET ORAL at 01:23

## 2022-12-20 RX ADMIN — LIDOCAINE 1 PATCH: 4 CREAM TOPICAL at 01:22

## 2022-12-20 NOTE — ED PROVIDER NOTE - NSICDXFAMILYHX_GEN_ALL_CORE_FT
FAMILY HISTORY:  Father  Still living? Unknown  Family history of alcohol abuse, Age at diagnosis: Age Unknown    Mother  Still living? Unknown  Family history of alcohol abuse, Age at diagnosis: Age Unknown

## 2022-12-20 NOTE — ED PROVIDER NOTE - PATIENT PORTAL LINK FT
You can access the FollowMyHealth Patient Portal offered by [facility] by registering at the following website: [email or web address]. By joining [employer]’s FollowMyHealth portal, you will also be able to view your health information using other applications (apps) compatible with our system.

## 2022-12-20 NOTE — ED PROVIDER NOTE - CLINICAL SUMMARY MEDICAL DECISION MAKING FREE TEXT BOX
61-year-old female, undomiciled, past medical history of left hip surgical repair secondary to fracture, presenting to the ED with acute on chronic left hip pain for the past 2 days. We will plan to obtain imaging to evaluate area further with x-ray.  Will give patient lidocaine patch tramadol and Tylenol for pain relief.  Will obtain urinalysis to check for any acute abnormalities.  Dispo pending medical work-up.

## 2022-12-20 NOTE — ED PROVIDER NOTE - PHYSICAL EXAMINATION
VITAL SIGNS: I have reviewed nursing notes and confirm.  CONSTITUTIONAL: Well-developed; well-nourished; in no acute distress.  SKIN: Skin is warm and dry, no acute rash.  HEAD: Normocephalic; atraumatic.  NECK: Supple; non tender.  CARD: S1, S2 normal; no murmurs, gallops, or rubs. Regular rate and rhythm.  RESP: No wheezes, rales or rhonchi.  ABD: Soft; non-distended; non-tender; no rebound or guarding.  EXT: +ttp along the L lateral hip w/ dec ROM 2/2 pain  SPINE: No spinal ttp  NEURO: Alert, oriented. Grossly unremarkable. JACKSON, normal tone, motor 4+/5 in LLE and 5/5 RLE, 5/5 UEs, No sensory changes. Fluent speech.   PSYCH: Cooperative, appropriate. Mood and affect wnl.

## 2022-12-20 NOTE — ED PROVIDER NOTE - OBJECTIVE STATEMENT
61-year-old female, undomiciled, past medical history of left hip surgical repair secondary to fracture, presenting to the ED with acute on chronic left hip pain for the past 2 days.  Patient states she sleeps in a shelter that has only chairs.  Patient has been sleeping in chairs for which she has noticed has worsened her hip pain.  She has taken naproxen with only mild relief.  Patient describes feeling the pain shooting down the back of her leg.  Denies any new falls or trauma.  Patient had her surgical hip repair in November of this year, and she has had a follow-up [w/ ortho] last month.

## 2022-12-21 LAB
CULTURE RESULTS: SIGNIFICANT CHANGE UP
SPECIMEN SOURCE: SIGNIFICANT CHANGE UP

## 2023-01-03 NOTE — ED PROVIDER NOTE - NS ED ROS FT
Continued Stay Review    Date: 01/03/2023                   Current Patient Class: IP Current Level of Care: MS    HPI:56 y o  male initially admitted on 12/29/2022    Assessment/Plan:   01/02 ID Consult: Right lower leg cellulitis, superimposed on underlying venous stasis  Leg CT is without abscess  Continue IV cefazolin  No further need for IV vancomycin  Serial leg exams  Cont aggressive leg elevation  01/03 Progress Notes: Pt reports pain in his lower extremities, improving  He is still having difficulty ambulating  Creatinine improved  Rpt BMP tomorrow  Cont IV Cefazolin  Cont IVF  Cont Eliquis  PT/OT recommending post acute rehab  CM following for placement  ID following      Vital Signs: /72   Pulse 81   Temp 99 4 °F (37 4 °C)   Resp 18   Ht 5' 5 98" (1 676 m)   Wt 91 6 kg (201 lb 15 1 oz)   SpO2 93%   BMI 32 61 kg/m²       Pertinent Labs/Diagnostic Results:   Results from last 7 days   Lab Units 01/01/23  1819   SARS-COV-2  Negative     Results from last 7 days   Lab Units 01/01/23  0606 12/30/22  0453 12/29/22  0937   WBC Thousand/uL 4 17* 4 97 6 21   HEMOGLOBIN g/dL 9 8* 10 8* 11 5*   HEMATOCRIT % 29 3* 32 7* 34 0*   PLATELETS Thousands/uL 211 227 225   NEUTROS ABS Thousands/µL  --  3 20 4 47         Results from last 7 days   Lab Units 01/01/23  0606 12/30/22  0453 12/29/22  0937   SODIUM mmol/L 132* 132* 133*   POTASSIUM mmol/L 3 9 3 5 4 2   CHLORIDE mmol/L 98 99 99   CO2 mmol/L 25 25 25   ANION GAP mmol/L 9 8 9   BUN mg/dL 16 15 16   CREATININE mg/dL 1 16 1 53* 1 52*   EGFR ml/min/1 73sq m 70 50 51   CALCIUM mg/dL 8 3 8 6 8 8   MAGNESIUM mg/dL  --  2 0  --      Results from last 7 days   Lab Units 12/29/22  0937   AST U/L 18   ALT U/L 13   ALK PHOS U/L 89   TOTAL PROTEIN g/dL 7 8   ALBUMIN g/dL 3 4*   TOTAL BILIRUBIN mg/dL 0 34         Results from last 7 days   Lab Units 01/01/23  0606 12/30/22  0453 12/29/22  0937   GLUCOSE RANDOM mg/dL 91 97 103           Results from last 7 days   Lab Units 12/29/22  0937   PROTIME seconds 15 5*   INR  1 26*   PTT seconds 38*         Results from last 7 days   Lab Units 12/29/22  1749   PROCALCITONIN ng/ml 0 11       Results from last 7 days   Lab Units 01/01/23  1819   INFLUENZA A PCR  Negative   INFLUENZA B PCR  Negative   RSV PCR  Negative       Results from last 7 days   Lab Units 12/29/22  0940   BLOOD CULTURE  No Growth After 5 Days  No Growth After 5 Days  Medications:   Scheduled Medications:  apixaban, 5 mg, Oral, BID  baclofen, 20 mg, Oral, TID  carvedilol, 12 5 mg, Oral, BID With Meals  cefazolin, 1,000 mg, Intravenous, Q8H  docusate sodium, 100 mg, Oral, BID  escitalopram, 10 mg, Oral, Daily  furosemide, 20 mg, Oral, Daily  gabapentin, 600 mg, Oral, TID  hydroxychloroquine, 400 mg, Oral, HS  loratadine, 10 mg, Oral, Daily  metolazone, 2 5 mg, Oral, Daily  mycophenolate, 500 mg, Oral, Q12H YOUSUF  polyethylene glycol, 17 g, Oral, Daily  senna, 2 tablet, Oral, HS  tamsulosin, 0 4 mg, Oral, Daily With Dinner  triamcinolone, , Topical, BID      Continuous IV Infusions:  multi-electrolyte, 75 mL/hr, Intravenous, Continuous      PRN Meds:  acetaminophen, 650 mg, Oral, Q6H PRN  HYDROmorphone, 0 2 mg, Intravenous, Q4H PRN 01/03 x 2  hydrOXYzine HCL, 10 mg, Oral, Q6H PRN  ondansetron, 4 mg, Intravenous, Q4H PRN  oxyCODONE, 7 5 mg, Oral, Q6H PRN   Or  oxyCODONE, 10 mg, Oral, Q6H PRN 01/03 x 2        Discharge Plan: D    Network Utilization Review Department  ATTENTION: Please call with any questions or concerns to [phone] and carefully listen to the prompts so that you are directed to the right person  All voicemails are confidential   Vince Mancera all requests for admission clinical reviews, approved or denied determinations and any other requests to dedicated fax number below belonging to the campus where the patient is receiving treatment   List of dedicated fax numbers for the Facilities:  FACILITY NAME UR FAX NUMBER   ADMISSION DENIALS (Administrative/Medical Necessity) [phone]   [address] (Maternity/NICU/Pediatrics) [address]anna [address] [place]  [phone]   [address] [facility] [address] [place] [address] [facility] [place] [address] [facility] [address] [facility] [place] [address] [facility] [phone] +hip pain  Denies fevers, chills, nausea, vomiting, diarrhea, constipation, abdominal pain, urinary symptoms, chest pain, palpitations, shortness of breath, dyspnea on exertion, syncope/near syncope, cough/URI symptoms, headache, weakness, numbness, focal deficits, visual changes, gait or balance changes, dizziness

## 2023-02-24 NOTE — ED ADULT NURSE NOTE - NSIMPLEMENTINTERV_GEN_ALL_ED
Left Vm for pt regarding upcoming visit on 2/27 with Nelson Wheat. Reminded pt to obtain CXR prior thirty minutes prior to the visit. Provided Pt office location.  
Implemented All Fall Risk Interventions:  [place] to call system. Call bell, personal items and telephone within reach. Instruct patient to call for assistance. Room bathroom lighting operational. Non-slip footwear when patient is off stretcher. Physically safe environment: no spills, clutter or unnecessary equipment. Stretcher in lowest position, wheels locked, appropriate side rails in place. Provide visual cue, wrist band, yellow gown, etc. Monitor gait and stability. Monitor for mental status changes and reorient to person, place, and time. Review medications for side effects contributing to fall risk. Reinforce activity limits and safety measures with patient and family.

## 2023-03-14 NOTE — H&P ADULT - PROBLEM/PLAN-4
1. Have you been to the ER, urgent care clinic since your last visit? Hospitalized since your last visit? No    2. Have you seen or consulted any other health care providers outside of the [address] [place] [address] since your last visit? Include any pap smears or colon screening.  No    Chief Complaint   Patient presents with    Surgical Follow-up     Sx 12/14/22 L2/S1 Laminectomy (PO#3) DISPLAY PLAN FREE TEXT

## 2024-02-20 NOTE — H&P ADULT - FAMILY HISTORY
Subjective:     Follow up/CC:  Chief Complaint   Patient presents with   • Diabetes Mellitus   • Office Visit   • Follow-up   PCP: Steffi Lama MD    Patient accompanied by son True   If accompanied by member/family, permission granted for discussion with them    Vanessa Eaton is a 84 year old female who is here for the evaluation and management of diabetes type 1 and osteoporosis    Patient accompanied by son Andrea and permission to examine and discuss medical information granted.    Had h/o TB s/p right upper lung lobectomy    [id number]    Vanessa Eaton was diagnosed with diabetes type 1  at age 11. This was discovered by symptoms of polyuria, polydipsia, weight loss, was hospitalized for 1 full month.   Complications related to diabetes are noted below.      Known intolerances to diabetes medications: none   She is on insulin therapy.  Was started with regular/NPH insulin, later transitioned to Lantus- Humalog.     [facility] lives at [facility] and they take care of her BG and insulin dosing, etc.  Lives at [facility].    Patient checks blood glucose 4 times a day with help.   Self Monitored Blood Glucose readings are as per download:    Relevant Medication:  Fosamax 70 mg weekly started 3/15/2022-  Dec 2022, stopped since then (other Osteoporosis med hx refer to A/P)  Forteo 20mcg injections nightly started 4/28/2023  Calcium with vit D 600/400- 1 tab BID  Vit D 5000 IU 2 days per week  Multivitamin daily   Atorvastatin 40 mg daily     Tresiba 9 units in the am      Humalog with sliding scale     Breakfast    Under 70: correct blood sugar/food    70-80: 1 units   81- 90: 2 units    : 4 units   101-150 5 units    151-200 6 units   201-250 7 units   251-300 7 u   301-350 8 u    351-400 9 u   401-450 10 u    451-500 11 u   Over 500 12 u      Lunch      Under 70: correct blood sugar/food   :1 unit     101-150 2 units   151-200 2 units     201-250 2 u   251-300 3 u     301-350 3  u   351-400 4u     401-450 4 u     451-500 5 u     Over 500 6 u        Supper   Under 70: correct blood sugar/food     1 unit    101-200 2 units   201-250 3 units  251-300 3 u    301-350 4 u    351-400 4 u   401-450 5 u    451-500 6 u    Over 500 8 u    Occ hypoglycemia prelunch noted, but overall very erratic blood sugars noted pre-lunch (ranging 70->500mg/dL).  Reports she tries to eat small portion of food /carb.  SCOTT has healed, follows dentist closely  No interval fall/fx    Hypoglycemic events?  [x]  YES    []  NO  DKA: [x]  YES    [x]  NO  DM related Hospitalization: []  YES    [x]  NO  If on short acting insulin, patient has glucagon prescription: [x]  YES    []  NO     Complications:  Retinopathy: []  YES    [x]  NO,  last eye exam: legally blind, had artificial right eye , many surgeries on left eye, follows Dr. Tito Webber for glaucoma  Nephropathy: []  YES    [x]  NO, last MALB nml 8/2023  Neuropathy: []  YES    [x]  NO  Cerebrovascular Accident: []  YES    [x]  NO  Coronary Artery Disease: []  YES    [x]  NO    Is patient on an ACE/ARB?  [x]  YES  []  NO    Is patient on Statin/Fibrate therapy? [x] YES  []  NO    Is patient on Aspirin therapy? [x]  YES  []  NO    Has patient had foot care education? [x]  YES  []  NO      Osteoporosis:  Has h/o RA  Fosamax: before Forteo  Forteo:  Years ago (before 2015)  Prolia: before 2018 started by [facility] endocrinologist, was off of this for 1 year, then started again by rheumatology after 6/2019, q6months, and received last dose of Prolia on 5/20/2021 which was on hold since then due to DIONJ. Started alendronate started since 3/2022 while monitoring CTX, but developed ONJ.   Alendronate stopped since Dec 2022  Forteo started since 4/28/2023.     Hyperlipidemia: She is currently taking a statin for management.    Component      Latest Ref Rng 8/7/2023  7:46 AM 8/7/2023  8:02 AM   URINE MICRO      mg/dL  1.73    CREATININE, URINE (TOTAL)      mg/dL  43.30     MICROALBUMIN/CREAT      <30.0 mg/g  40.0 (H)    Creatinine      0.51 - 0.95 mg/dL 0.87     Glomerular Filtration Rate      >=60  67     GLYCOHEMOGLOBIN A1C      4.5 - 5.6 % 6.4 (H)     VITAMIND, 25 HYDROXY      30.0 - 100.0 ng/mL 36.6     Albumin      3.6 - 5.1 g/dL 4.5     C. Telopeptide Beta Cross Linked      pg/mL 706     CALCIUM      8.4 - 10.2 mg/dL 9.9     Bone Specific Alkaline Phosphate      ug/L 12.8      Labs:    Lab Results   Component Value Date    CHXNYOGG2X 7.2 (A) 06/30/2021     Lab Results   Component Value Date    HGBA1C 8.6 (H) 02/14/2024    HGBA1C 6.4 (H) 08/07/2023    HGBA1C 6.3 (H) 03/30/2023    HGBA1C 7.4 (H) 12/12/2022    HGBA1C 7.2 (H) 06/10/2022    HGBA1C 7.9 (H) 01/07/2022     Lab Results   Component Value Date    MALBCR 40.0 (H) 08/07/2023    MALBCR 10.2 12/02/2021    MALBCR 10.4 06/07/2021    MALBCR  03/18/2020      Comment:      Unable to calculate due to low analyte concentration    MALBCR 25.7 06/12/2019    MALBCR 7.0 03/06/2019     Lab Results   Component Value Date    CREATININE 0.79 02/14/2024    CREATININE 0.87 08/07/2023    CREATININE 0.96 (H) 03/30/2023    CREATININE 0.83 12/12/2022     Lab Results   Component Value Date    GFRESTIMATE 75 02/14/2024    GFRESTIMATE 67 08/07/2023    GFRESTIMATE 59 (L) 03/30/2023    GFRESTIMATE 71 12/12/2022     Lab Results   Component Value Date    CHOLESTEROL 196 02/14/2024    CALCLDL 94 02/14/2024    HDL 69 02/14/2024    TRIGLYCERIDE 166 (H) 02/14/2024    NONHDL 127 02/14/2024     No results found for: \"TSH\"      DXA 8/7/2023:  Findings:     Lumbar spine:  (L1-L4):   1.115 g/cm2  T-score: -0.5  Z-score: 1.6  Lumbar % Change: No significant change., g/cm2 Change: No significant  change.     Lowest femoral:  Neck Right-  0.645 g/cm2  T-score: -2.8  Z-score: -0.4     Femoral % and g/cm2 Change:   Total right femur: 3.2% decrease, g/cm2 Change: -0.024     Left Forearm:  Radius 33%: 0.416 g/cm2  T-score: -4.2  Z-score: -1.3  The forearm was not  scanned previously.   Forearm 1/3 radius LSC: 0.055 g/cm2        PAST HISTORIES:  I have reviewed the past medical history, family history, social history, medications and allergies listed in the medical record as obtained by my nursing staff and support staff and agree with their documentation.      Objective:     Visit Vitals  /52   Pulse 67   Wt 53.4 kg (117 lb 12.8 oz)   SpO2 95%   BMI 23.78 kg/m²     Blood pressure 120/52, pulse 67, weight 53.4 kg (117 lb 12.8 oz), SpO2 95 %.Body mass index is 23.78 kg/m².    CONST: vital signs reviewed, conversant, no acute distress, well-groomed  EYES: anicteric sclera, no lid lag/proptosis  ENMT: hearing grossly intact,lips symmetrical, smooth, pink   Neck: No visible neck mass, symmetric, trachea midline,no thyromegaly, no palpable nodule  RESP: normal respiratory effort  SKIN:no visible rash  MSK: no cyanosis of the digits, walks w walker, no spinal tenderness upon palpation  NEURO: CN II- XII grossly intact, no tremor    Diabetic Foot Exam Documentation     Normal Bilateral Foot Exam:  Skin integrity is normal. Dorsalis pedis and posterior tibial pulses are present.  Pressure sensation using the [place] monofilament is present.      PSYCH: AOx3, appropriate insight and judgment, euthymic mood/appropriate affect      Assessment/Plan:     1. Encounter for monitoring teriparatide therapy    2. Type 1 diabetes mellitus with other ophthalmic complication (CMD)    3. Hypoglycemia unawareness in type 1 diabetes mellitus (CMD)          Severe Osteoporosis s/p bisphsphonate, c/b MRONJ (healed): Long hx of osteoporosis with multiple fractures (including left elbow fx in 11/2015, and left prox humerus fracture in 12/2020, h/o left hip fx 8/2019) h/o Fosamax use and Forteo for 2 years, switched to Prolia by OSH doctor at least since 6/2019 q6months, last dose of Prolia 5/20/2021. Stopped 12/2021 in the setting of DIONJ. Started Fosamax 70mg weekly 3/2022 after gum has  healed but stopped in 12/2022 after developing MRONJ again. Forteo started since 4/28/2023.   8/2023 CTX trended up.    PLAN:  Bisphosphonates and Denosumab are currently much less favored option due to recurrent MRONJ hx.  Patient has severe osteoporosis history with multiple fragility fractures as per above. Although she had used Forteo before, FDA has approved use of re-use of Forteo >2 years in those with severe osteoporosis requiring medications. Since denosumab and bisphosphonates are relatively contraindicated due to prior MRONJ hx, I strongly rec use of Forteo >2 years to help build bone and decr risk of fx as long as no serious side effect is seen.  Forteo started since 4/2023, tolerating  8/2023: DXA shows mild decr in hip density, osteoporosis of left FA baseline.   2/2024: calcium slightly higher, D wnl  PLAN  Cont Forteo 20mcg nightly  Lower calcium to 600mg daily  Cont D at  5000 International Units twice weekly  Monitor calcium/D level      Diabetes Mellitus type 1, under poor control.   Most recent A1c was   Lab Results   Component Value Date    HGBA1C 8.6 (H) 02/14/2024     Erratic BG pattern although overall hyperglycemid predinner/bedtime  Currently on Tresiba 9 units nightly, and humalog scale per above.  Pt willing to try CGM.   Rec CGM start by working with our DM educator.  Rec new scale (1 unit incr for lunch/dinner)  Cont Tresiba 9 units.     No known Retinopathy: Pt legally blind. Glycemic control as noted above.  Follow up with ophthalmology Dr. Tito Webber closely, recently   + Nephropathy, CKD IIIa  Glycemic and BP control as noted  No Neuropathy:  Glycemic control as above    HLP:  High intensity statin therapy is recommended for age 40-75 with additional risk factors for CVD (LDL >100, HTN, smoking, overweight/obesity). On Atorvastatin , cholesterol acceptable given age.    Labs per order  F/up in 3-4 mo    Patient Instructions     Tresiba 9 units in the am      Humalog with sliding  scale     Breakfast    Under 70: correct blood sugar/food    70-80: 1 units   81- 90: 2 units    : 4 units   101-150 5 units    151-200 6 units   201-250 7 units   251-300 7 u   301-350 8 u    351-400 9 u   401-450 10 u    451-500 11 u   Over 500 12 u      Lunch      Under 70: correct blood sugar/food   :2 units  101-150 3 units   151-200 3 units     201-250 3 u   251-300 4 u     301-350 4 u   351-400 5u     401-450 5 u     451-500 6 u     Over 500 7 u      Supper   Under 70: correct blood sugar/food     2 unit    101-200 3 units   201-250 4 units  251-300 4 u    301-350 5 u    351-400 5 u   401-450 6 u    451-500 7 u    Over 500 9 u        All recent endocrine labs and studies, if available, were reviewed with the patient and all of pt's questions were answered to pt and son's apparent satisfaction. Both are in agreement with above mentioned plan.     Orders Placed This Encounter   • Glycohemoglobin   • Creatinine   • Albumin   • Calcium   • Vitamin D -25 Hydroxy   • SERVICE TO DIABETES EDUCATION   • IBUPROFEN PO   • Calcium Carb-Cholecalciferol (Calcium 600+D) 600-10 MG-MCG Tab           Thank you for allowing me to participate in this patient's care.     Ramos Haney MD    CC: Steffi Lama MD   No pertinent family history in first degree relatives Father  Still living? Unknown  Family history of alcohol abuse, Age at diagnosis: Age Unknown     Mother  Still living? Unknown  Family history of alcohol abuse, Age at diagnosis: Age Unknown

## 2024-10-23 NOTE — ED PROVIDER NOTE - DIAGNOSIS COUNSELING, MDM
- Ongoing         conducted a detailed discussion... I had a detailed discussion with the patient and/or guardian regarding the historical points, exam findings, and any diagnostic results supporting the discharge/admit diagnosis.